# Patient Record
Sex: FEMALE | Race: WHITE | Employment: FULL TIME | URBAN - METROPOLITAN AREA
[De-identification: names, ages, dates, MRNs, and addresses within clinical notes are randomized per-mention and may not be internally consistent; named-entity substitution may affect disease eponyms.]

---

## 2017-02-28 ENCOUNTER — OFFICE VISIT (OUTPATIENT)
Dept: FAMILY MEDICINE CLINIC | Age: 32
End: 2017-02-28

## 2017-02-28 ENCOUNTER — TELEPHONE (OUTPATIENT)
Dept: FAMILY MEDICINE CLINIC | Age: 32
End: 2017-02-28

## 2017-02-28 VITALS
HEIGHT: 69 IN | TEMPERATURE: 99 F | DIASTOLIC BLOOD PRESSURE: 82 MMHG | SYSTOLIC BLOOD PRESSURE: 119 MMHG | OXYGEN SATURATION: 98 % | WEIGHT: 293 LBS | BODY MASS INDEX: 43.4 KG/M2 | RESPIRATION RATE: 16 BRPM | HEART RATE: 98 BPM

## 2017-02-28 DIAGNOSIS — N92.1 MENORRHAGIA WITH IRREGULAR CYCLE: Primary | ICD-10-CM

## 2017-02-28 LAB
BILIRUB UR QL STRIP: NORMAL
GLUCOSE UR-MCNC: NEGATIVE MG/DL
HCG URINE, QL. (POC): NEGATIVE
HGB BLD-MCNC: 12.6 G/DL
KETONES P FAST UR STRIP-MCNC: NORMAL MG/DL
PH UR STRIP: 5.5 [PH] (ref 4.6–8)
PROT UR QL STRIP: NORMAL MG/DL
SP GR UR STRIP: 1.03 (ref 1–1.03)
UA UROBILINOGEN AMB POC: NORMAL (ref 0.2–1)
URINALYSIS CLARITY POC: NORMAL
URINALYSIS COLOR POC: NORMAL
URINE BLOOD POC: NORMAL
URINE LEUKOCYTES POC: NEGATIVE
URINE NITRITES POC: NEGATIVE
VALID INTERNAL CONTROL?: YES

## 2017-02-28 RX ORDER — FERROUS SULFATE 325(65) MG
325 TABLET, DELAYED RELEASE (ENTERIC COATED) ORAL 2 TIMES DAILY WITH MEALS
Qty: 60 TAB | Refills: 0 | Status: SHIPPED | OUTPATIENT
Start: 2017-02-28

## 2017-02-28 RX ORDER — IBUPROFEN 800 MG/1
800 TABLET ORAL
Qty: 30 TAB | Refills: 0 | Status: SHIPPED | OUTPATIENT
Start: 2017-02-28

## 2017-02-28 RX ORDER — NORGESTIMATE AND ETHINYL ESTRADIOL 0.25-0.035
1 KIT ORAL DAILY
Qty: 1 PACKAGE | Refills: 10 | Status: SHIPPED | OUTPATIENT
Start: 2017-02-28 | End: 2017-03-09

## 2017-02-28 NOTE — PROGRESS NOTES
Allan Corbett is a 36 y.o. female who presents for heavy menses. Reports prolonged bleeding since December. Had spotting on and off, not her normal menstrual period. Then a few days ago developed excessive bleeding and passing clots. Soaking through pads and tampons. Not taking OCP's. Taking advil for cramps. The start of her current bleeding episode was 2017. She has a history of irregular menses for which she was on OCP's a long time ago and then stopped. She is not sexually active. She does not desire fertility at this time. Last pap in  was normal. Has history of obesity and prediabetes. She is motivated to start a weight loss program. Wants to become more active and start eating healthier. PMHx:  Past Medical History:   Diagnosis Date    Heart murmur of      HLD (hyperlipidemia)     Pre-diabetes        Meds:   Current Outpatient Prescriptions   Medication Sig Dispense Refill    ferrous sulfate (IRON) 325 mg (65 mg iron) EC tablet Take 1 Tab by mouth two (2) times daily (with meals). 60 Tab 0    ibuprofen (MOTRIN) 800 mg tablet Take 1 Tab by mouth every eight (8) hours as needed for Pain. 30 Tab 0    norgestimate-ethinyl estradiol (ORTHO-CYCLEN, SPRINTEC) 0.25-35 mg-mcg tab Take 1 Tab by mouth daily. 1 Package 10    cyclobenzaprine (FLEXERIL) 5 mg tablet Take 5 mg by mouth.  fluticasone (FLONASE) 50 mcg/actuation nasal spray 2 Sprays by Both Nostrils route once.          Allergies:   No Known Allergies    Smoker:  History   Smoking Status    Former Smoker    Packs/day: 0.25    Years: 10.00    Types: Cigarettes    Quit date: 2014   Smokeless Tobacco    Never Used       ETOH:   History   Alcohol Use    3.0 oz/week    5 Standard drinks or equivalent per week     Comment: social       FH:   Family History   Problem Relation Age of Onset    Heart Disease Mother     Hypertension Mother     Psychiatric Disorder Mother     Diabetes Father     Heart Disease Father  Psychiatric Disorder Sister     Cancer Maternal Grandmother      Lung cancer    Cancer Maternal Grandfather     Diabetes Other      Paternal great-grandmother   Joya Flores Other      family members with kidney stones       ROS:  General/Constitutional:   No headache, fever, fatigue, weight loss or weight gain       Eyes:   No redness, pruritis, pain, visual changes, swelling, or discharge      Ears:    No pain, loss or changes in hearing     Neck:   No swelling, masses, stiffness, pain, or limited movement     Cardiac:    No chest pain      Respiratory:   No cough or shortness of breath     GI:   No nausea/vomiting, diarrhea, abdominal pain, bloody or dark stools       :   No dysuria or  hematuria    Neurological:   No loss of consciousness, dizziness, seizures, dysarthria, cognitive changes, memory changes,  problems with balance, or unilateral weakness     Skin: No rash     Physical Exam:  Visit Vitals    /82 (BP 1 Location: Left arm, BP Patient Position: Sitting)    Pulse 98    Temp 99 °F (37.2 °C) (Oral)    Resp 16    Ht 5' 9\" (1.753 m)    Wt 321 lb 1.6 oz (145.7 kg)    LMP 02/28/2017    SpO2 98%    BMI 47.42 kg/m2     GEN: No apparent distress. Alert and oriented and responds to all questions appropriately. EYES:  Conjunctiva clear; pupils round and reactive to light; extraocular movements are intact. EAR: External ears are normal.  Tympanic membranes are clear and without effusion. NOSE: Turbinates are within normal limits. No drainage  OROPHYARYNX: No oral lesions or exudates. NECK:  Supple; no masses; thyroid normal           LUNGS: Respirations unlabored; clear to auscultation bilaterally  CARDIOVASCULAR: Regular, rate, and rhythm without murmurs, gallops or rubs   ABDOMEN: Soft; nontender; nondistended; normoactive bowel sounds; no masses or organomegaly  NEUROLOGIC:  No focal neurologic deficits. Strength and sensation grossly intact. Coordination and gait grossly intact. EXT: Well perfused. No edema. SKIN: No obvious rashes. Assessment:    ICD-10-CM ICD-9-CM    1. Menorrhagia with irregular cycle N92.1 626.2 AMB POC HEMOGLOBIN (HGB)      AMB POC URINE PREGNANCY TEST, VISUAL COLOR COMPARISON      AMB POC URINALYSIS DIP STICK AUTO W/O MICRO      US PELV NON OBS      US TRANSVAGINAL      ferrous sulfate (IRON) 325 mg (65 mg iron) EC tablet      ibuprofen (MOTRIN) 800 mg tablet      norgestimate-ethinyl estradiol (ORTHO-CYCLEN, SPRINTEC) 0.25-35 mg-mcg tab   2. BMI 45.0-49.9, adult [facility]) Z68.42 V85.42      35 yo morbidly obese female with menorrhagia and irregular menstrual cycles. Plan: Start OCP's and iron supplement. Motrin prn. Ordered pelvic ultrasound and will have pt return in one week for follow up and to discuss results. Likely has PCOS and features of metabolic syndrome, consider adding metformin. Discussed the patient's BMI with her. The BMI follow up plan is as follows: I have counseled this patient on diet and exercise regimens      Follow-up Disposition:  Return in about 1 week (around 3/7/2017).      María Elena Hudson, PGY-3  Family Medicine Resident

## 2017-02-28 NOTE — PROGRESS NOTES
Chief Complaint   Patient presents with    Menstrual Problem     excessive bleeding since this morning clotting from past two days, just pain today.

## 2017-02-28 NOTE — MR AVS SNAPSHOT
Visit Information Date & Time Provider Department Dept. Phone Encounter #  
 2/28/2017  5:30 PM Marianela Redding, Greg5 [facility] [phone] [id number] Follow-up Instructions Return in about 1 week (around 3/7/2017). Upcoming Health Maintenance Date Due  
 PAP AKA CERVICAL CYTOLOGY 5/5/2018 DTaP/Tdap/Td series (2 - Td) 7/10/2024 Allergies as of 2/28/2017  Review Complete On: 2/28/2017 By: Vira Ratliff LPN No Known Allergies Current Immunizations  Reviewed on 11/27/2016 Name Date Influenza Vaccine 11/1/2016 MMR 8/14/2014, 7/14/2014 TB Skin Test (PPD) 7/14/2014 TB Skin Test (PPD) Intradermal 8/4/2015, 7/14/2015 Tdap 7/10/2014 Not reviewed this visit You Were Diagnosed With   
  
 Codes Comments Menorrhagia with irregular cycle    -  Primary ICD-10-CM: N92.1 ICD-9-CM: 626.2 Vitals BP  
  
  
  
  
  
 119/82 (BP 1 Location: Left arm, BP Patient Position: Sitting) BMI and BSA Data Body Mass Index Body Surface Area  
 47.42 kg/m 2 2.66 m 2 Preferred Pharmacy Pharmacy Name Phone [facility] [place], [address] [place] AT [facility] [phone] Your Updated Medication List  
  
   
This list is accurate as of: 2/28/17  6:07 PM.  Always use your most recent med list.  
  
  
  
  
 cyclobenzaprine 5 mg tablet Commonly known as:  FLEXERIL Take 5 mg by mouth. ferrous sulfate 325 mg (65 mg iron) EC tablet Commonly known as:  IRON Take 1 Tab by mouth two (2) times daily (with meals). fluticasone 50 mcg/actuation nasal spray Commonly known as:  Ba Riser 2 Sprays by Both Nostrils route once. ibuprofen 800 mg tablet Commonly known as:  MOTRIN Take 1 Tab by mouth every eight (8) hours as needed for Pain.  
  
 norgestimate-ethinyl estradiol 0.25-35 mg-mcg Tab Commonly known as:  Keely Logan Take 1 Tab by mouth daily. Prescriptions Sent to Pharmacy Refills  
 ferrous sulfate (IRON) 325 mg (65 mg iron) EC tablet 0 Sig: Take 1 Tab by mouth two (2) times daily (with meals). Class: Normal  
 Pharmacy: [address] Ph #: [phone] Route: Oral  
 ibuprofen (MOTRIN) 800 mg tablet 0 Sig: Take 1 Tab by mouth every eight (8) hours as needed for Pain. Class: Normal  
 Pharmacy: [address] Ph #: [phone] Route: Oral  
 norgestimate-ethinyl estradiol (ORTHO-CYCLEN, SPRINTEC) 0.25-35 mg-mcg tab 10 Sig: Take 1 Tab by mouth daily. Class: Normal  
 Pharmacy: [address] Ph #: [phone] Route: Oral  
  
We Performed the Following AMB POC HEMOGLOBIN (HGB) [16243 CPT(R)] AMB POC URINALYSIS DIP STICK AUTO W/O MICRO [15366 CPT(R)] AMB POC URINE PREGNANCY TEST, VISUAL COLOR COMPARISON [56048 CPT(R)] Follow-up Instructions Return in about 1 week (around 3/7/2017). To-Do List   
 02/28/2017 Imaging:  US PELV NON OBS   
  
 02/28/2017 Imaging:  US TRANSVAGINAL Patient Instructions Anemia From Heavy Bleeding: Care Instructions Your Care Instructions Anemia means that your body does not have enough red blood cells. Red blood cells carry oxygen around the body. When you have anemia, you may feel dizzy, tired, and weak. You may also feel your heart pounding. For some people, it's hard to focus and think clearly. One common cause of anemia is bleeding. Bleeding from ulcers, hemorrhoids, cancer, or other problems can cause anemia. It may also be caused by heavy menstrual periods. Your treatment may include iron pills. Iron helps your body make hemoglobin. Hemoglobin is the part of the red blood cell that carries oxygen. If you have severe anemia, you may need a blood transfusion to give you red blood cells as quickly as possible. Sometimes it takes several months to get iron levels back to normal. 
Follow-up care is a key part of your treatment and safety. Be sure to make and go to all appointments, and call your doctor if you are having problems. It's also a good idea to know your test results and keep a list of the medicines you take. How can you care for yourself at home? · Be safe with medicines. Take your medicines exactly as prescribed. Call your doctor if you think you are having a problem with your medicine. · Follow your doctor's advice about eating foods that have a lot of iron in them. These include red meat, shellfish, poultry, and eggs. They also include beans, raisins, whole-grain bread, and leafy green vegetables. · Steam your vegetables. This is the best way to prepare them if you want to get as much iron as possible. · Iron pills can cause constipation. If you take them, there are things you can do to avoid constipation. Drink plenty of fluids, eat foods with a lot of fiber, and exercise every day. When should you call for help? Call 911 anytime you think you may need emergency care. For example, call if: 
· You passed out (lost consciousness). · Your stools are maroon or very bloody. Call your doctor now or seek immediate medical care if: 
· You have new or worse trouble breathing. · You are dizzy or lightheaded, or you feel like you may faint. · You have any abnormal bleeding, such as: 
¨ Nosebleeds. ¨ Vaginal bleeding that is different (heavier, more frequent, at a different time of the month) than what you are used to. ¨ Bloody or black stools, or rectal bleeding. ¨ Bloody or pink urine. Watch closely for changes in your health, and be sure to contact your doctor if: 
· You feel weaker or more tired than usual. 
· You do not get better as expected. Where can you learn more? Go to http://yumiko-maxine.info/. Enter A287 in the search box to learn more about \"Anemia From Heavy Bleeding: Care Instructions. \" Current as of: June 17, 2016 Content Version: 11.1 © 7845-7196 [employer]. Care instructions adapted under license by [employer] (which disclaims liability or warranty for this information). If you have questions about a medical condition or this instruction, always ask your healthcare professional. [address] any warranty or liability for your use of this information. Introducing [facility]! Josh Kilpatrick introduces [employer] patient portal. Now you can access parts of your medical record, email your doctor's office, and request medication refills online. 1. In your internet browser, go to [email or web address] 2. Click on the First Time User? Click Here link in the Sign In box. You will see the New Member Sign Up page. 3. Enter your Zimbra Access Code exactly as it appears below. You will not need to use this code after youve completed the sign-up process. If you do not sign up before the expiration date, you must request a new code. · Zimbra Access Code: [email or web address] Expires: 5/29/2017  6:07 PM 
 
4. Enter the last four digits of your Social Security Number (xxxx) and Date of Birth (mm/dd/yyyy) as indicated and click Submit. You will be taken to the next sign-up page. 5. Create a [employer] ID. This will be your Zimbra login ID and cannot be changed, so think of one that is secure and easy to remember. 6. Create a Zimbra password. You can change your password at any time. 7. Enter your Password Reset Question and Answer.  This can be used at a later time if you forget your password. 8. Enter your e-mail address. You will receive e-mail notification when new information is available in [address] 9. Click Sign Up. You can now view and download portions of your medical record. 10. Click the Download Summary menu link to download a portable copy of your medical information. If you have questions, please visit the Frequently Asked Questions section of the [employer] website. Remember, [employer] is NOT to be used for urgent needs. For medical emergencies, dial 911. Now available from your iPhone and Android! Please provide this summary of care documentation to your next provider. Your primary care clinician is listed as Joya Monroe. If you have any questions after today's visit, please call [phone].

## 2017-02-28 NOTE — TELEPHONE ENCOUNTER
Called to say has had 3 heavy days of menstrual cycle with bleeding. Also a lot of clots. Offered an appointment for today and accepted.     Tuesday, February 28, 2017 05:30 PM   [facility] OFFICE, [email or web address], SameDayAppt, 15min    excessive heavy menstrual cycle for the past few days, Dr. Willis Dumas patient, cm

## 2017-02-28 NOTE — PATIENT INSTRUCTIONS
Anemia From Heavy Bleeding: Care Instructions  Your Care Instructions    Anemia means that your body does not have enough red blood cells. Red blood cells carry oxygen around the body. When you have anemia, you may feel dizzy, tired, and weak. You may also feel your heart pounding. For some people, it's hard to focus and think clearly. One common cause of anemia is bleeding. Bleeding from ulcers, hemorrhoids, cancer, or other problems can cause anemia. It may also be caused by heavy menstrual periods. Your treatment may include iron pills. Iron helps your body make hemoglobin. Hemoglobin is the part of the red blood cell that carries oxygen. If you have severe anemia, you may need a blood transfusion to give you red blood cells as quickly as possible. Sometimes it takes several months to get iron levels back to normal.  Follow-up care is a key part of your treatment and safety. Be sure to make and go to all appointments, and call your doctor if you are having problems. It's also a good idea to know your test results and keep a list of the medicines you take. How can you care for yourself at home? · Be safe with medicines. Take your medicines exactly as prescribed. Call your doctor if you think you are having a problem with your medicine. · Follow your doctor's advice about eating foods that have a lot of iron in them. These include red meat, shellfish, poultry, and eggs. They also include beans, raisins, whole-grain bread, and leafy green vegetables. · Steam your vegetables. This is the best way to prepare them if you want to get as much iron as possible. · Iron pills can cause constipation. If you take them, there are things you can do to avoid constipation. Drink plenty of fluids, eat foods with a lot of fiber, and exercise every day. When should you call for help? Call 911 anytime you think you may need emergency care. For example, call if:  · You passed out (lost consciousness).   · Your stools are maroon or very bloody. Call your doctor now or seek immediate medical care if:  · You have new or worse trouble breathing. · You are dizzy or lightheaded, or you feel like you may faint. · You have any abnormal bleeding, such as:  ¨ Nosebleeds. ¨ Vaginal bleeding that is different (heavier, more frequent, at a different time of the month) than what you are used to. ¨ Bloody or black stools, or rectal bleeding. ¨ Bloody or pink urine. Watch closely for changes in your health, and be sure to contact your doctor if:  · You feel weaker or more tired than usual.  · You do not get better as expected. Where can you learn more? Go to http://yumiko-maxine.info/. Enter P707 in the search box to learn more about \"Anemia From Heavy Bleeding: Care Instructions. \"  Current as of: June 17, 2016  Content Version: 11.1  © 5366-7727 [employer]. Care instructions adapted under license by [employer] (which disclaims liability or warranty for this information). If you have questions about a medical condition or this instruction, always ask your healthcare professional. [place] [address] any warranty or liability for your use of this information.

## 2017-03-06 ENCOUNTER — HOSPITAL ENCOUNTER (OUTPATIENT)
Dept: ULTRASOUND IMAGING | Age: 32
Discharge: HOME | End: 2017-03-06
Attending: FAMILY MEDICINE
Payer: COMMERCIAL

## 2017-03-06 DIAGNOSIS — N92.1 MENORRHAGIA WITH IRREGULAR CYCLE: ICD-10-CM

## 2017-03-06 PROCEDURE — 76830 TRANSVAGINAL US NON-OB: CPT

## 2017-03-06 PROCEDURE — 76856 US EXAM PELVIC COMPLETE: CPT

## 2017-03-07 ENCOUNTER — HOSPITAL ENCOUNTER (OUTPATIENT)
Age: 32
Setting detail: OBSERVATION
Discharge: HOME | End: 2017-03-07
Attending: EMERGENCY MEDICINE | Admitting: FAMILY MEDICINE
Payer: COMMERCIAL

## 2017-03-07 ENCOUNTER — TELEPHONE (OUTPATIENT)
Dept: FAMILY MEDICINE CLINIC | Age: 32
End: 2017-03-07

## 2017-03-07 VITALS
HEART RATE: 107 BPM | WEIGHT: 293 LBS | TEMPERATURE: 99.4 F | DIASTOLIC BLOOD PRESSURE: 50 MMHG | SYSTOLIC BLOOD PRESSURE: 136 MMHG | HEIGHT: 69 IN | BODY MASS INDEX: 43.4 KG/M2 | OXYGEN SATURATION: 96 % | RESPIRATION RATE: 18 BRPM

## 2017-03-07 DIAGNOSIS — D64.9 ANEMIA, UNSPECIFIED: Primary | ICD-10-CM

## 2017-03-07 DIAGNOSIS — N92.1 MENORRHAGIA WITH IRREGULAR CYCLE: ICD-10-CM

## 2017-03-07 LAB
ANION GAP BLD CALC-SCNC: 10 MMOL/L (ref 5–15)
BASOPHILS # BLD AUTO: 0 K/UL (ref 0–0.1)
BASOPHILS # BLD: 0 % (ref 0–1)
BUN SERPL-MCNC: 8 MG/DL (ref 6–20)
BUN/CREAT SERPL: 11 (ref 12–20)
CALCIUM SERPL-MCNC: 8.1 MG/DL (ref 8.5–10.1)
CHLORIDE SERPL-SCNC: 105 MMOL/L (ref 97–108)
CO2 SERPL-SCNC: 24 MMOL/L (ref 21–32)
CREAT SERPL-MCNC: 0.76 MG/DL (ref 0.55–1.02)
EOSINOPHIL # BLD: 0 K/UL (ref 0–0.4)
EOSINOPHIL NFR BLD: 0 % (ref 0–7)
ERYTHROCYTE [DISTWIDTH] IN BLOOD BY AUTOMATED COUNT: 13.3 % (ref 11.5–14.5)
GLUCOSE SERPL-MCNC: 104 MG/DL (ref 65–100)
HCT VFR BLD AUTO: 25.9 % (ref 35–47)
HGB BLD-MCNC: 8.5 G/DL (ref 11.5–16)
LYMPHOCYTES # BLD AUTO: 19 % (ref 12–49)
LYMPHOCYTES # BLD: 2.1 K/UL (ref 0.8–3.5)
MCH RBC QN AUTO: 29.6 PG (ref 26–34)
MCHC RBC AUTO-ENTMCNC: 32.8 G/DL (ref 30–36.5)
MCV RBC AUTO: 90.2 FL (ref 80–99)
MONOCYTES # BLD: 0.4 K/UL (ref 0–1)
MONOCYTES NFR BLD AUTO: 3 % (ref 5–13)
NEUTS SEG # BLD: 8.9 K/UL (ref 1.8–8)
NEUTS SEG NFR BLD AUTO: 78 % (ref 32–75)
PLATELET # BLD AUTO: 208 K/UL (ref 150–400)
POTASSIUM SERPL-SCNC: 4.2 MMOL/L (ref 3.5–5.1)
RBC # BLD AUTO: 2.87 M/UL (ref 3.8–5.2)
SODIUM SERPL-SCNC: 139 MMOL/L (ref 136–145)
WBC # BLD AUTO: 11.4 K/UL (ref 3.6–11)

## 2017-03-07 PROCEDURE — 99284 EMERGENCY DEPT VISIT MOD MDM: CPT

## 2017-03-07 PROCEDURE — 36415 COLL VENOUS BLD VENIPUNCTURE: CPT | Performed by: EMERGENCY MEDICINE

## 2017-03-07 PROCEDURE — 74011250636 HC RX REV CODE- 250/636: Performed by: PHYSICIAN ASSISTANT

## 2017-03-07 PROCEDURE — 80048 BASIC METABOLIC PNL TOTAL CA: CPT | Performed by: EMERGENCY MEDICINE

## 2017-03-07 PROCEDURE — 96360 HYDRATION IV INFUSION INIT: CPT

## 2017-03-07 PROCEDURE — 74011250637 HC RX REV CODE- 250/637: Performed by: PHYSICIAN ASSISTANT

## 2017-03-07 PROCEDURE — 85025 COMPLETE CBC W/AUTO DIFF WBC: CPT | Performed by: EMERGENCY MEDICINE

## 2017-03-07 RX ORDER — SODIUM CHLORIDE 9 MG/ML
1000 INJECTION, SOLUTION INTRAVENOUS ONCE
Status: COMPLETED | OUTPATIENT
Start: 2017-03-07 | End: 2017-03-07

## 2017-03-07 RX ADMIN — ESTROGENS, CONJUGATED 2 MG: 0.62 TABLET, FILM COATED ORAL at 19:33

## 2017-03-07 RX ADMIN — SODIUM CHLORIDE 1000 ML/HR: 900 INJECTION, SOLUTION INTRAVENOUS at 18:53

## 2017-03-07 NOTE — TELEPHONE ENCOUNTER
Spoke with patient and she stated she was seen in the clinic yesterday for irregular bleeding in her menstrual cycle. She informed me she was given birth control pills, ibuprofen 600mg and iron to take to control bleeding, and f/u Thursday. Today she stated her bleeding has continued and she is having to change her pad every 30 minutes and is concerned. I asked the opinion of another nurse to confirm that she should be evaluated by the ER at this point since she is losing so much blood. Patient agreed.

## 2017-03-07 NOTE — Clinical Note
Patient Class[de-identified] Observation [571] Type of Bed: Telemetry Remote [29] Reason for Observation: anemia Admitting Physician: Agustin Walker [[id number]] Attending Physician: Agustin Walker [[id number]] Diagnosis: anemia

## 2017-03-07 NOTE — TELEPHONE ENCOUNTER
Patient called back to office. She has been bleeding since her appointment on 2/28 with Dr. Daniella Holliday. She has a f/u appointment scheduled for this Thursday but would like to speak with a nurse.  Call triaged by nurse Savannah Lantigua

## 2017-03-07 NOTE — TELEPHONE ENCOUNTER
Patient would like to speak with a nurse, she states she is still having the same symptoms, I informed the patient the nurse is in clinic and asked if I can take a message she stated that's fine she will go to the hospital and hung up. I was unable to get any further information and she hung up before I could speak again.

## 2017-03-07 NOTE — ED TRIAGE NOTES
Pt reports pelvic/transvaginal US yesterday per GYN, . Pt reports heavy bright red vaginal bleeding with clots x 2 day, but bleeding started 2/28/17. Using 2 pads per hour.

## 2017-03-07 NOTE — ED PROVIDER NOTES
HPI Comments: This is a 37 yo  female with medical hx remarkable for hyperlipidemia, and pre-diabetes presenting ambulatory to the ED with complaint of increased vaginal bleeding for the past 36 hrs, soaking a 1.5 pads per hr. Passing large clots with standing. Has tried laying around with minimal improvement. Has had spotting since 2016. Saw PCP last week when bleeding became more heavy. Started on OCP, iron and ibuprofen for pain. Some improvement for 3-4 days. Hx of similar several years ago. US pelvic yesterday remarkable for Nabothian cyst. Some fatigue. No dizziness, syncope, headache, chest pain, SOB, abdominal pain, nausea, vomiting, or urinary complaint. Denies sexual activity. Patient is a 36 y.o. female presenting with vaginal bleeding. The history is provided by the patient. Vaginal Bleeding   Pertinent negatives include no chest pain, no abdominal pain, no headaches and no shortness of breath.         Past Medical History:   Diagnosis Date    Heart murmur of      HLD (hyperlipidemia)     Pre-diabetes        Past Surgical History:   Procedure Laterality Date    HX WISDOM TEETH EXTRACTION           Family History:   Problem Relation Age of Onset    Heart Disease Mother     Hypertension Mother     Psychiatric Disorder Mother     Diabetes Father     Heart Disease Father     Psychiatric Disorder Sister     Cancer Maternal Grandmother      Lung cancer    Cancer Maternal Grandfather     Diabetes Other      Paternal great-grandmother   Feliciano Duong Other      family members with kidney stones       Social History     Social History    Marital status: SINGLE     Spouse name: N/A    Number of children: 0    Years of education: 15     Occupational History    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[employer]    Student      Goes to [facility] [place] [facility] for health services      Social History Main Topics    Smoking status: Former Smoker     Packs/day: 0.25     Years: 10.00     Types: Cigarettes     Quit date: 7/30/2014    Smokeless tobacco: Never Used    Alcohol use 3.0 oz/week     5 Standard drinks or equivalent per week      Comment: social    Drug use: No    Sexual activity: Yes     Birth control/ protection: Condom     Other Topics Concern    Not on file     Social History Narrative         ALLERGIES: Review of patient's allergies indicates no known allergies. Review of Systems   Constitutional: Negative. Negative for chills, fatigue and fever. HENT: Negative. Negative for congestion, ear pain, facial swelling, rhinorrhea, sneezing and sore throat. Eyes: Negative for pain, discharge and itching. Respiratory: Negative for cough, chest tightness and shortness of breath. Cardiovascular: Negative. Negative for chest pain and leg swelling. Gastrointestinal: Negative. Negative for abdominal distention, abdominal pain, constipation, diarrhea, nausea and vomiting. Genitourinary: Positive for vaginal bleeding. Negative for difficulty urinating, frequency and urgency. Musculoskeletal: Negative for arthralgias, back pain, joint swelling, neck pain and neck stiffness. Skin: Negative for color change and rash. Neurological: Negative for dizziness, numbness and headaches. Psychiatric/Behavioral: Negative for confusion and decreased concentration. All other systems reviewed and are negative. Vitals:    03/07/17 1428   BP: 133/79   Pulse: (!) 107   Resp: 18   Temp: 99.4 °F (37.4 °C)   SpO2: 99%   Weight: 147.4 kg (324 lb 15.3 oz)   Height: 5' 9\" (1.753 m)            Physical Exam   Constitutional: She is oriented to person, place, and time. She appears well-developed and well-nourished. No distress. Obese  female in NAD   HENT:   Head: Normocephalic and atraumatic. Right Ear: External ear normal.   Left Ear: External ear normal.   Nose: Nose normal.   Mouth/Throat: Oropharynx is clear and moist. No oropharyngeal exudate.    Eyes: Conjunctivae and EOM are normal. Pupils are equal, round, and reactive to light. Right eye exhibits no discharge. Left eye exhibits no discharge. No scleral icterus. Neck: Normal range of motion. Neck supple. Cardiovascular: Regular rhythm. Exam reveals no gallop and no friction rub. No murmur heard. Tachycardic     Pulmonary/Chest: Effort normal and breath sounds normal. She has no wheezes. She has no rales. Abdominal: Soft. Bowel sounds are normal. She exhibits no distension. There is no tenderness. There is no rebound and no guarding. Genitourinary: Cervix exhibits no motion tenderness and no discharge. Right adnexum displays no mass. Left adnexum displays no mass. There is bleeding (~20 cc's in vault with continued bleeding ) in the vagina. No signs of injury around the vagina. Neurological: She is alert and oriented to person, place, and time. Skin: Skin is warm and dry. She is not diaphoretic. Psychiatric: She has a normal mood and affect. Her behavior is normal.   Nursing note and vitals reviewed. MDM  Number of Diagnoses or Management Options  Anemia, unspecified:   Menorrhagia with irregular cycle:   Diagnosis management comments: 37 yo  female with vaginal bleeding x one week worse in past 48 hrs. Significant bleeding on exam with tachycardia. Unremarkable pelvic US yesterday. On iron and OCP. Plan  CBC and CMP. POc urine preg negative. Esperanza Burgess [address]         Amount and/or Complexity of Data Reviewed  Clinical lab tests: ordered and reviewed      ED Course       Procedures   Progress note  Labs reviewed. 3 gram Hgb drop per patient last 11. Will consult Ob hospitalist. Esperanza Burgess [address]  [facility] Madan Schroeder to guilherme kessler. Esperanza Burgess [address]  Pt seen and evaluated [facility]. Pt comfortable with D/C home and will return for worsening. [facility] Esperanza Santos [address]  Discussed case with attending Physician Michelle Portillo.   Agrees with care and will D/C with follow up. April ED [place], [address]    A/P  Menorrhagia: Follow-up with OB/gyn in two days. Start medications as prescribed. Return for any new or worsening.  April ED [facility], [address]

## 2017-03-08 NOTE — PROGRESS NOTES
Patient seen and evaluated. Recommend to stabilize uterine lining and cease bleeding, take estrogen tablet daily for 10 days (3/7/17 through 3/16/2017); then on 3/17/2017, begin taking OCPs- Reclipsen. MD verbally called medications into pharmacy already.

## 2017-03-08 NOTE — CONSULTS
Gynecology History and Physical    Name: Lear Alpers MRN: [id number] SSN: [id number]    YOB: 1985  Age: 36 y.o. Sex: female       Subjective:      Chief complaint: Vaginal Bleeding    Frank Gregorio is a 36 y.o.  female presents to ER with increased vaginal bleeding. She reports history of irregular menses. She states menses started 17, she immediately started on OCPs then, bleeding decreased, and today 1 week later, expressing increased vaginal bleeding. She reports was informed to begin OCPs right away on . She denies any dizziness, weakness nor palpitations. H/H in ER 8., MCV 90; she reports feeling hungry. The current method of family planning is oral contraceptive (estrogen/progesterone).     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      Goes to [employer] for health services      Social History Main Topics    Smoking status: Former Smoker     Packs/day: 0.25     Years: 10.00     Types: Cigarettes     Quit date: 2014    Smokeless tobacco: Never Used    Alcohol use 3.0 oz/week     5 Standard drinks or equivalent per week      Comment: social    Drug use: No    Sexual activity: Yes     Birth control/ protection: Condom     Family History   Problem Relation Age of Onset    Heart Disease Mother     Hypertension Mother     Psychiatric Disorder Mother     Diabetes Father     Heart Disease Father     Psychiatric Disorder Sister     Cancer Maternal Grandmother      Lung cancer    Cancer Maternal Grandfather     Diabetes Other      Paternal great-grandmother   Favio Herb Other      family members with kidney stones        No Known Allergies  Prior to Admission medications    Medication Sig Start Date End Date Taking? Authorizing Provider   LORATADINE (CLARITIN PO) Take  by mouth. Yes Jose Mae MD   ferrous sulfate (IRON) 325 mg (65 mg iron) EC tablet Take 1 Tab by mouth two (2) times daily (with meals). 2/28/17  Yes Yohana Navarro MD   ibuprofen (MOTRIN) 800 mg tablet Take 1 Tab by mouth every eight (8) hours as needed for Pain. 2/28/17  Yes Yohana Navarro MD   norgestimate-ethinyl estradiol (ORTHO-CYCLEN, [facility]) 0.25-35 mg-mcg tab Take 1 Tab by mouth daily. 2/28/17  Yes Yohana Navarro MD        Review of Systems:  A comprehensive review of systems was negative except for that written in the History of Present Illness. Objective:     Vitals:    03/07/17 1428   BP: 133/79   Pulse: (!) 107   Resp: 18   Temp: 99.4 °F (37.4 °C)   SpO2: 99%   Weight: 147.4 kg (324 lb 15.3 oz)   Height: 5' 9\" (1.753 m)       Physical Exam:  Patient without distress. Heart: Regular rate and rhythm  Lung: clear to auscultation throughout lung fields, no wheezes, no rales, no rhonchi and normal respiratory effort  Abdomen: obese, soft, nontender  External Genitalia: normal general appearance  Vagina: normal mucosa without prolapse or lesions  Cervix: normal appearance  Adnexa: normal bimanual exam  Uterus: normal single, nontender    Assessment:     Irregular menses, menorrhagia    Plan:     Discussion held with patient regarding reorganizing her endometrial linning. Recommend use of estrogen tablets 2 mg po orally for 10 days and then bridge to Cyclic OCPs, 1 tablet po daily x 3 weeks, then off x 1 week. Continue to take iron supplementation 325 mg, 1 tablet po BID with Vitamin C/orange juices. Advise to maintain adequate hydration. Pelvic ultrasound reviewed and is normal. Patient is clinically stable for outpatient management. Follow up @ office as scheduled for follow up bleeding profile.      Signed By:  Jodie Merchant MD     March 7, 2017

## 2017-03-08 NOTE — DISCHARGE INSTRUCTIONS
We hope that we have addressed all of your medical concerns. The examination and treatment you received in the Emergency Department were for an emergent problem and were not intended as complete care. It is important that you follow up with your healthcare provider(s) for ongoing care. If your symptoms worsen or do not improve as expected, and you are unable to reach your usual health care provider(s), you should return to the Emergency Department. Today's healthcare is undergoing tremendous change, and patient satisfaction surveys are one of the many tools to assess the quality of medical care. You may receive a survey from the [employer] regarding your experience in the Emergency Department. I hope that your experience has been completely positive, particularly the medical care that I provided. As such, please participate in the survey; anything less than excellent does not meet my expectations or intentions. [address] [facility] and [facility] participate in nationally recognized quality of care measures. If your blood pressure is greater than 120/80, as reported below, we urge that you seek medical care to address the potential of high blood pressure, commonly known as hypertension. Hypertension can be hereditary or can be caused by certain medical conditions, pain, stress, or \"white coat syndrome. \"       Please make an appointment with your health care provider(s) for follow up of your Emergency Department visit. VITALS:   Patient Vitals for the past 8 hrs:   Temp Pulse Resp BP SpO2   03/07/17 1428 99.4 °F (37.4 °C) (!) 107 18 133/79 99 %          Thank you for allowing us to provide you with medical care today. We realize that you have many choices for your emergency care needs. Please choose us in the future for any continued health care needs. Regards,           April ED.  [place], [address] Office: [phone]            Recent Results (from the past 24 hour(s))   CBC WITH AUTOMATED DIFF    Collection Time: 03/07/17  6:14 PM   Result Value Ref Range    WBC 11.4 (H) 3.6 - 11.0 K/uL    RBC 2.87 (L) 3.80 - 5.20 M/uL    HGB 8.5 (L) 11.5 - 16.0 g/dL    HCT 25.9 (L) 35.0 - 47.0 %    MCV 90.2 80.0 - 99.0 FL    MCH 29.6 26.0 - 34.0 PG    MCHC 32.8 30.0 - 36.5 g/dL    RDW 13.3 11.5 - 14.5 %    PLATELET 667 494 - 781 K/uL    NEUTROPHILS 78 (H) 32 - 75 %    LYMPHOCYTES 19 12 - 49 %    MONOCYTES 3 (L) 5 - 13 %    EOSINOPHILS 0 0 - 7 %    BASOPHILS 0 0 - 1 %    ABS. NEUTROPHILS 8.9 (H) 1.8 - 8.0 K/UL    ABS. LYMPHOCYTES 2.1 0.8 - 3.5 K/UL    ABS. MONOCYTES 0.4 0.0 - 1.0 K/UL    ABS. EOSINOPHILS 0.0 0.0 - 0.4 K/UL    ABS. BASOPHILS 0.0 0.0 - 0.1 K/UL   METABOLIC PANEL, BASIC    Collection Time: 03/07/17  6:14 PM   Result Value Ref Range    Sodium 139 136 - 145 mmol/L    Potassium 4.2 3.5 - 5.1 mmol/L    Chloride 105 97 - 108 mmol/L    CO2 24 21 - 32 mmol/L    Anion gap 10 5 - 15 mmol/L    Glucose 104 (H) 65 - 100 mg/dL    BUN 8 6 - 20 MG/DL    Creatinine 0.76 0.55 - 1.02 MG/DL    BUN/Creatinine ratio 11 (L) 12 - 20      GFR est AA >60 >60 ml/min/1.73m2    GFR est non-AA >60 >60 ml/min/1.73m2    Calcium 8.1 (L) 8.5 - 10.1 MG/DL       No results found. Anemia: Care Instructions  Your Care Instructions    Anemia is a low level of red blood cells, which carry oxygen throughout your body. Many things can cause anemia. Lack of iron is one of the most common causes. Your body needs iron to make hemoglobin, a substance in red blood cells that carries oxygen from the lungs to your body's cells. Without enough iron, the body produces fewer and smaller red blood cells. As a result, your body's cells do not get enough oxygen, and you feel tired and weak. And you may have trouble concentrating. Bleeding is the most common cause of a lack of iron.  You may have heavy menstrual bleeding or bleeding caused by conditions such as ulcers, hemorrhoids, or cancer. Regular use of aspirin or other anti-inflammatory medicines (such as ibuprofen) also can cause bleeding in some people. A lack of iron in your diet also can cause anemia, especially at times when the body needs more iron, such as during pregnancy, infancy, and the teen years. Your doctor may have prescribed iron pills. It may take several months of treatment for your iron levels to return to normal. Your doctor also may suggest that you eat foods that are rich in iron, such as meat and beans. There are many other causes of anemia. It is not always due to a lack of iron. Finding the specific cause of your anemia will help your doctor find the right treatment for you. Follow-up care is a key part of your treatment and safety. Be sure to make and go to all appointments, and call your doctor if you are having problems. It's also a good idea to know your test results and keep a list of the medicines you take. How can you care for yourself at home? · Take your medicines exactly as prescribed. Call your doctor if you think you are having a problem with your medicine. · If your doctor recommends iron pills, take them as directed:  ¨ Try to take the pills on an empty stomach about 1 hour before or 2 hours after meals. But you may need to take iron with food to avoid an upset stomach. ¨ Do not take antacids or drink milk or caffeine drinks (such as coffee, tea, or cola) at the same time or within 2 hours of the time that you take your iron. They can make it hard for your body to absorb the iron. ¨ Vitamin C (from food or supplements) helps your body absorb iron. Try taking iron pills with a glass of orange juice or some other food that is high in vitamin C, such as citrus fruits. ¨ Iron pills may cause stomach problems, such as heartburn, nausea, diarrhea, constipation, and cramps.  Be sure to drink plenty of fluids, and include fruits, vegetables, and fiber in your diet each day. Iron pills often make your bowel movements dark or green. ¨ If you forget to take an iron pill, do not take a double dose of iron the next time you take a pill. ¨ Keep iron pills out of the reach of small children. An overdose of iron can be very dangerous. · Follow your doctor's advice about eating iron-rich foods. These include red meat, shellfish, poultry, eggs, beans, raisins, whole-grain bread, and leafy green vegetables. · Steam vegetables to help them keep their iron content. When should you call for help? Call 911 anytime you think you may need emergency care. For example, call if:  · You have symptoms of a heart attack. These may include:  ¨ Chest pain or pressure, or a strange feeling in the chest.  ¨ Sweating. ¨ Shortness of breath. ¨ Nausea or vomiting. ¨ Pain, pressure, or a strange feeling in the back, neck, jaw, or upper belly or in one or both shoulders or arms. ¨ Lightheadedness or sudden weakness. ¨ A fast or irregular heartbeat. After you call 911, the  may tell you to chew 1 adult-strength or 2 to 4 low-dose aspirin. Wait for an ambulance. Do not try to drive yourself. · You passed out (lost consciousness). Call your doctor now or seek immediate medical care if:  · You have new or increased shortness of breath. · You are dizzy or lightheaded, or you feel like you may faint. · Your fatigue and weakness continue or get worse. · You have any abnormal bleeding, such as:  ¨ Nosebleeds. ¨ Vaginal bleeding that is different (heavier, more frequent, at a different time of the month) than what you are used to. ¨ Bloody or black stools, or rectal bleeding. ¨ Bloody or pink urine. Watch closely for changes in your health, and be sure to contact your doctor if:  · You do not get better as expected. Where can you learn more? Go to http://yumiko-maxine.info/. Enter R301 in the search box to learn more about \"Anemia: Care Instructions. \"  Current as of: February 5, 2016  Content Version: 11.1  © 0931-7138 [employer]. Care instructions adapted under license by [employer] (which disclaims liability or warranty for this information). If you have questions about a medical condition or this instruction, always ask your healthcare professional. [address] any warranty or liability for your use of this information. Anemia From Heavy Bleeding: Care Instructions  Your Care Instructions    Anemia means that your body does not have enough red blood cells. Red blood cells carry oxygen around the body. When you have anemia, you may feel dizzy, tired, and weak. You may also feel your heart pounding. For some people, it's hard to focus and think clearly. One common cause of anemia is bleeding. Bleeding from ulcers, hemorrhoids, cancer, or other problems can cause anemia. It may also be caused by heavy menstrual periods. Your treatment may include iron pills. Iron helps your body make hemoglobin. Hemoglobin is the part of the red blood cell that carries oxygen. If you have severe anemia, you may need a blood transfusion to give you red blood cells as quickly as possible. Sometimes it takes several months to get iron levels back to normal.  Follow-up care is a key part of your treatment and safety. Be sure to make and go to all appointments, and call your doctor if you are having problems. It's also a good idea to know your test results and keep a list of the medicines you take. How can you care for yourself at home? · Be safe with medicines. Take your medicines exactly as prescribed. Call your doctor if you think you are having a problem with your medicine. · Follow your doctor's advice about eating foods that have a lot of iron in them. These include red meat, shellfish, poultry, and eggs. They also include beans, raisins, whole-grain bread, and leafy green vegetables. · Steam your vegetables.  This is the best way to prepare them if you want to get as much iron as possible. · Iron pills can cause constipation. If you take them, there are things you can do to avoid constipation. Drink plenty of fluids, eat foods with a lot of fiber, and exercise every day. When should you call for help? Call 911 anytime you think you may need emergency care. For example, call if:  · You passed out (lost consciousness). · Your stools are maroon or very bloody. Call your doctor now or seek immediate medical care if:  · You have new or worse trouble breathing. · You are dizzy or lightheaded, or you feel like you may faint. · You have any abnormal bleeding, such as:  ¨ Nosebleeds. ¨ Vaginal bleeding that is different (heavier, more frequent, at a different time of the month) than what you are used to. ¨ Bloody or black stools, or rectal bleeding. ¨ Bloody or pink urine. Watch closely for changes in your health, and be sure to contact your doctor if:  · You feel weaker or more tired than usual.  · You do not get better as expected. Where can you learn more? Go to http://yumiko-maxine.info/. Enter H981 in the search box to learn more about \"Anemia From Heavy Bleeding: Care Instructions. \"  Current as of: June 17, 2016  Content Version: 11.1  © 6788-8784 [employer]. Care instructions adapted under license by [employer] (which disclaims liability or warranty for this information). If you have questions about a medical condition or this instruction, always ask your healthcare professional. [place] [address] any warranty or liability for your use of this information.

## 2017-03-08 NOTE — ED NOTES
Discharged by April, [place] -- written instructions provided. Ambulated out of ED accompanied by family.

## 2017-03-09 ENCOUNTER — APPOINTMENT (OUTPATIENT)
Dept: ULTRASOUND IMAGING | Age: 32
DRG: 812 | End: 2017-03-09
Attending: STUDENT IN AN ORGANIZED HEALTH CARE EDUCATION/TRAINING PROGRAM
Payer: COMMERCIAL

## 2017-03-09 ENCOUNTER — HOSPITAL ENCOUNTER (INPATIENT)
Age: 32
LOS: 2 days | Discharge: HOME | DRG: 812 | End: 2017-03-11
Attending: EMERGENCY MEDICINE | Admitting: FAMILY MEDICINE
Payer: COMMERCIAL

## 2017-03-09 DIAGNOSIS — D64.9 SYMPTOMATIC ANEMIA: Primary | ICD-10-CM

## 2017-03-09 DIAGNOSIS — N93.9 VAGINAL BLEEDING: ICD-10-CM

## 2017-03-09 LAB
ANION GAP BLD CALC-SCNC: 12 MMOL/L (ref 5–15)
BUN SERPL-MCNC: 7 MG/DL (ref 6–20)
BUN/CREAT SERPL: 8 (ref 12–20)
CALCIUM SERPL-MCNC: 8.3 MG/DL (ref 8.5–10.1)
CHLORIDE SERPL-SCNC: 105 MMOL/L (ref 97–108)
CO2 SERPL-SCNC: 22 MMOL/L (ref 21–32)
CREAT SERPL-MCNC: 0.93 MG/DL (ref 0.55–1.02)
GLUCOSE SERPL-MCNC: 145 MG/DL (ref 65–100)
POTASSIUM SERPL-SCNC: 3.8 MMOL/L (ref 3.5–5.1)
SODIUM SERPL-SCNC: 139 MMOL/L (ref 136–145)
TSH SERPL DL<=0.05 MIU/L-ACNC: 3.14 UIU/ML (ref 0.36–3.74)

## 2017-03-09 PROCEDURE — 83001 ASSAY OF GONADOTROPIN (FSH): CPT

## 2017-03-09 PROCEDURE — 74011250636 HC RX REV CODE- 250/636: Performed by: STUDENT IN AN ORGANIZED HEALTH CARE EDUCATION/TRAINING PROGRAM

## 2017-03-09 PROCEDURE — P9016 RBC LEUKOCYTES REDUCED: HCPCS | Performed by: EMERGENCY MEDICINE

## 2017-03-09 PROCEDURE — 80048 BASIC METABOLIC PNL TOTAL CA: CPT | Performed by: EMERGENCY MEDICINE

## 2017-03-09 PROCEDURE — 85025 COMPLETE CBC W/AUTO DIFF WBC: CPT | Performed by: EMERGENCY MEDICINE

## 2017-03-09 PROCEDURE — 76857 US EXAM PELVIC LIMITED: CPT

## 2017-03-09 PROCEDURE — 74011250636 HC RX REV CODE- 250/636: Performed by: FAMILY MEDICINE

## 2017-03-09 PROCEDURE — 86900 BLOOD TYPING SEROLOGIC ABO: CPT | Performed by: EMERGENCY MEDICINE

## 2017-03-09 PROCEDURE — 76830 TRANSVAGINAL US NON-OB: CPT

## 2017-03-09 PROCEDURE — 77030029131 HC ADMN ST IV BLD N DEHP ICUM -B

## 2017-03-09 PROCEDURE — 74011250636 HC RX REV CODE- 250/636: Performed by: EMERGENCY MEDICINE

## 2017-03-09 PROCEDURE — 86920 COMPATIBILITY TEST SPIN: CPT | Performed by: EMERGENCY MEDICINE

## 2017-03-09 PROCEDURE — 93005 ELECTROCARDIOGRAM TRACING: CPT

## 2017-03-09 PROCEDURE — 84443 ASSAY THYROID STIM HORMONE: CPT

## 2017-03-09 PROCEDURE — 94761 N-INVAS EAR/PLS OXIMETRY MLT: CPT

## 2017-03-09 PROCEDURE — 36415 COLL VENOUS BLD VENIPUNCTURE: CPT | Performed by: EMERGENCY MEDICINE

## 2017-03-09 PROCEDURE — 99285 EMERGENCY DEPT VISIT HI MDM: CPT

## 2017-03-09 PROCEDURE — 30233N1 TRANSFUSION OF NONAUTOLOGOUS RED BLOOD CELLS INTO PERIPHERAL VEIN, PERCUTANEOUS APPROACH: ICD-10-PCS | Performed by: FAMILY MEDICINE

## 2017-03-09 PROCEDURE — 74011000258 HC RX REV CODE- 258: Performed by: FAMILY MEDICINE

## 2017-03-09 PROCEDURE — 65270000029 HC RM PRIVATE

## 2017-03-09 PROCEDURE — 36430 TRANSFUSION BLD/BLD COMPNT: CPT

## 2017-03-09 PROCEDURE — 96360 HYDRATION IV INFUSION INIT: CPT

## 2017-03-09 RX ORDER — ESTRADIOL 2 MG/1
2 TABLET ORAL DAILY
COMMUNITY
End: 2017-03-11

## 2017-03-09 RX ORDER — SODIUM CHLORIDE 0.9 % (FLUSH) 0.9 %
5-10 SYRINGE (ML) INJECTION AS NEEDED
Status: DISCONTINUED | OUTPATIENT
Start: 2017-03-09 | End: 2017-03-11 | Stop reason: HOSPADM

## 2017-03-09 RX ORDER — DESOGESTREL AND ETHINYL ESTRADIOL 0.15-0.03
1 KIT ORAL DAILY
COMMUNITY
End: 2017-03-09

## 2017-03-09 RX ORDER — LANOLIN ALCOHOL/MO/W.PET/CERES
325 CREAM (GRAM) TOPICAL 2 TIMES DAILY WITH MEALS
Status: DISCONTINUED | OUTPATIENT
Start: 2017-03-10 | End: 2017-03-11 | Stop reason: HOSPADM

## 2017-03-09 RX ORDER — DIPHENHYDRAMINE HCL 25 MG
25 CAPSULE ORAL
Status: DISCONTINUED | OUTPATIENT
Start: 2017-03-09 | End: 2017-03-11 | Stop reason: HOSPADM

## 2017-03-09 RX ORDER — DESOGESTREL AND ETHINYL ESTRADIOL 0.15-0.03
1 KIT ORAL DAILY
COMMUNITY
End: 2017-03-17

## 2017-03-09 RX ORDER — LORATADINE 10 MG/1
10 TABLET ORAL
COMMUNITY

## 2017-03-09 RX ORDER — SODIUM CHLORIDE 0.9 % (FLUSH) 0.9 %
5-10 SYRINGE (ML) INJECTION EVERY 8 HOURS
Status: DISCONTINUED | OUTPATIENT
Start: 2017-03-09 | End: 2017-03-09

## 2017-03-09 RX ORDER — SODIUM CHLORIDE 9 MG/ML
250 INJECTION, SOLUTION INTRAVENOUS AS NEEDED
Status: DISCONTINUED | OUTPATIENT
Start: 2017-03-09 | End: 2017-03-11 | Stop reason: HOSPADM

## 2017-03-09 RX ORDER — LORATADINE 10 MG/1
10 TABLET ORAL
Status: DISCONTINUED | OUTPATIENT
Start: 2017-03-09 | End: 2017-03-11 | Stop reason: HOSPADM

## 2017-03-09 RX ORDER — SODIUM CHLORIDE 0.9 % (FLUSH) 0.9 %
5-10 SYRINGE (ML) INJECTION AS NEEDED
Status: DISCONTINUED | OUTPATIENT
Start: 2017-03-09 | End: 2017-03-09

## 2017-03-09 RX ORDER — SODIUM CHLORIDE 0.9 % (FLUSH) 0.9 %
5-10 SYRINGE (ML) INJECTION EVERY 8 HOURS
Status: DISCONTINUED | OUTPATIENT
Start: 2017-03-09 | End: 2017-03-11 | Stop reason: HOSPADM

## 2017-03-09 RX ORDER — SODIUM CHLORIDE 9 MG/ML
185 INJECTION, SOLUTION INTRAVENOUS CONTINUOUS
Status: DISCONTINUED | OUTPATIENT
Start: 2017-03-09 | End: 2017-03-11 | Stop reason: HOSPADM

## 2017-03-09 RX ADMIN — SODIUM CHLORIDE 1000 ML: 900 INJECTION, SOLUTION INTRAVENOUS at 15:28

## 2017-03-09 RX ADMIN — Medication 10 ML: at 18:06

## 2017-03-09 RX ADMIN — SODIUM CHLORIDE 185 ML/HR: 900 INJECTION, SOLUTION INTRAVENOUS at 20:15

## 2017-03-09 RX ADMIN — SODIUM CHLORIDE 1000 ML: 900 INJECTION, SOLUTION INTRAVENOUS at 19:07

## 2017-03-09 RX ADMIN — SODIUM CHLORIDE 25 MG: 900 INJECTION, SOLUTION INTRAVENOUS at 18:05

## 2017-03-09 NOTE — H&P
[facility] [facility] [address] [place] [address]   Office [phone], Fax [phone]      History & Physical    Date of admission: 3/9/2017    Patient name: Jame Fuentes  MRN: [id number]  YOB: 1985  Age: 36 y.o. Primary care provider:  Ronna Pinedo MD     Source of Information: patient and medical records                                      Subjective:       Chief complain: vaginal bleeding    History of present illness:  Ms. Martin Osborn is a   36 y.o. female with past medical history significant for irregular periods, pre-diabetes, HLD and anemia who presents to the ED complaining of vaginal bleeding. Pt states that her vaginal bleeding started about 10 days ago. She went to see her PCP last Tuesday who started the pt on Sprintec to help control the pt's bleeding. She states was taking iron pills and Motrin also. Vaginal bleeding was worsening with large clots. She was feeling weak, dizzy, having palpitations, and came to the ED 2 days ago, where OBGYN started her on Estrogen. Pt claims that she has continued to experience vaginal bleeding with associated generalized weakness, SOB with walking and pallor. She vomited once today. She has been using 2 pads in an hour. Pt reports that she has been taking iron supplements. She denies any use of blood thinners, h/o liver disease, fever, chills, vaginal discharge ou foul odor. chest pain. Pt states that she has an irregular menstrual cycle with heavy vaginal bleeding. LMP in January 2017, irregular, lasting 3-7 days, pap smear 2 years ago was normal. Not sexually active for about a year.    US done on 3/6 shows no uterine fibroids or ovarian cystic mass detected ( obstructed by bowel gas.)  Hb : 12.5 in 05/31, 8.5 on 3/7, 5.8 on 3/9    Emergency Room Course:   Patient Vitals for the past 12 hrs:   Temp Pulse Resp BP SpO2   03/09/17 1645 - (!) 130 16 99/63 -   03/09/17 1630 - (!) 118 22 94/53 97 %   03/09/17 1600 - (!) 119 24 94/44 100 %   17 1530 - - - - 99 %   17 1530 - (!) 126 14 92/46 -   17 1500 - (!) 134 22 (!) 110/39 94 %   17 1449 - - - 125/73 -   17 1448 98.1 °F (36.7 °C) (!) 144 15 125/73 97 %      Labs: remarkable for Hb of 5.8  ECG showed sinus tachycardia. Pt was treated with   Medications   sodium chloride (NS) flush 5-10 mL (not administered)   sodium chloride (NS) flush 5-10 mL (not administered)   congugated estrogens (PREMARIN) 25 mg in 0.9% sodium chloride 50 mL IVPB (not administered)   sodium chloride 0.9 % bolus infusion 1,000 mL (1,000 mL IntraVENous New Bag 3/9/17 1528)         No Known Allergies    Prior to Admission Medications   Prescriptions Last Dose Informant Patient Reported? Taking?   desogestrel-ethinyl estradiol (APRI) 0.15-0.03 mg tab NOT started yet  Yes Yes   Sig: Take 1 Tab by mouth daily. estradiol (ESTRACE) 2 mg tablet 3/9/2017 at AM  Yes Yes   Sig: Take 2 mg by mouth daily. For 10 days starting 3/8/17, then transition to OCP (Apri)   ferrous sulfate (IRON) 325 mg (65 mg iron) EC tablet 3/9/2017 at AM  No Yes   Sig: Take 1 Tab by mouth two (2) times daily (with meals). ibuprofen (MOTRIN) 800 mg tablet   No Yes   Sig: Take 1 Tab by mouth every eight (8) hours as needed for Pain.   loratadine (CLARITIN) 10 mg tablet   Yes Yes   Sig: Take 10 mg by mouth daily as needed for Allergies. Facility-Administered Medications: None       Past Medical History:   Diagnosis Date    Heart murmur of      HLD (hyperlipidemia)     Pre-diabetes         Past Surgical History:   Procedure Laterality Date    HX WISDOM TEETH EXTRACTION            SOCIAL HISTORY    - Alcohol history: Rarely    - Smoking history: Non-smoker    - Illicit drug history: Not at all    - Living arrangement: patient lives with their family.     - Ambulates: Independently       Preventive history:  Immunization History   Administered Date(s) Administered    Influenza Vaccine 2016    MMR 2014, 2014    TB Skin Test (PPD) 2014    TB Skin Test (PPD) Intradermal 2015, 2015    Tdap 07/10/2014       CODE STATUS discussed with the patient/caregivers  FULL CODE      The following are negative unless bolded. General Fever, chills and unexpected weight change. HENT Ear pain, sinus pressure and sore throat. Eyes Visual disturbance. Respiratory Cough, chest tightness, shortness of breath and wheezing. Cardio Chest pain, palpitations and leg swelling. GI Nausea, vomiting, abd pain, diarrhea, constipation and blood in stool.  Dysuria. Extremities Myalgias and arthralgias. Skin Color change and pallor. Neuro Weakness and headaches. Behavioral Confusion, nervous, anxious. The remainder of the review of systems was reviewed and is noncontributory. Objective:    Patient Vitals for the past 12 hrs:   BP Temp Pulse Resp SpO2 Height Weight   17 1645 99/63 - (!) 130 16 - - -   17 1630 94/53 - (!) 118 22 97 % - -   17 1600 94/44 - (!) 119 24 100 % - -   17 1530 - - - - 99 % - -   17 1530 92/46 - (!) 126 14 - - -   17 1500 (!) 110/39 - (!) 134 22 94 % - -   17 1449 125/73 - - - - - -   17 1448 125/73 98.1 °F (36.7 °C) (!) 144 15 97 % 5' 9\" (1.753 m) 320 lb (145.2 kg)     Temp (24hrs), Av.1 °F (36.7 °C), Min:98.1 °F (36.7 °C), Max:98.1 °F (36.7 °C)    No intake or output data in the 24 hours ending 17 1746       O2 Device: Room air      Physical Exam  Visit Vitals    BP 99/63    Pulse (!) 130    Temp 98.1 °F (36.7 °C)    Resp 16    Ht 5' 9\" (1.753 m)    Wt 320 lb (145.2 kg)    LMP 2017    SpO2 97%    BMI 47.26 kg/m2       Vitals Reviewed. General Oriented to person, place, and time and well-developed. Appears well-nourished, no distress. Not diaphoretic, but weak    HENT Head Normocephalic and atraumatic. Eyes Conjunctivae are normal, no discharge. No scleral icterus. Nose Nose normal, clear turbinates. Oral Oropharynx is clear and moist. No oropharyngeal exudate. Neck No thyromegaly present. No cervical adenopathy. Cardio Normal rate, regular rhythm. Exam reveals no gallop and no friction rub. No murmur heard. No chest wall tenderness. Pulmonary Effort normal and breath sounds normal. No respiratory distress. No wheezes, no rales. Abdominal Soft. Bowel sounds normal. No distension. No tenderness.  Bright red blood from vagina. Pelvic exam ordered,but not done. Extremities No edema of lower extremities. No tenderness. Distal pulses intact. Neurological Alert and oriented to person, place, and time. Dermatology Skin is warm and dry. No rash noted. No erythema or pallor. Psychiatric Affect and judgment normal.        Data Review    Laboratory Data  Recent Results (from the past 8 hour(s))   CBC WITH AUTOMATED DIFF    Collection Time: 03/09/17  2:58 PM   Result Value Ref Range    WBC 14.7 (H) 3.6 - 11.0 K/uL    RBC 1.97 (L) 3.80 - 5.20 M/uL    HGB 5.8 (LL) 11.5 - 16.0 g/dL    HCT 18.1 (L) 35.0 - 47.0 %    MCV 91.9 80.0 - 99.0 FL    MCH 29.4 26.0 - 34.0 PG    MCHC 32.0 30.0 - 36.5 g/dL    RDW 14.5 11.5 - 14.5 %    PLATELET 752 910 - 515 K/uL    NEUTROPHILS 74 32 - 75 %    LYMPHOCYTES 21 12 - 49 %    MONOCYTES 5 5 - 13 %    EOSINOPHILS 0 0 - 7 %    BASOPHILS 0 0 - 1 %    ABS. NEUTROPHILS 10.9 (H) 1.8 - 8.0 K/UL    ABS. LYMPHOCYTES 3.1 0.8 - 3.5 K/UL    ABS. MONOCYTES 0.7 0.0 - 1.0 K/UL    ABS. EOSINOPHILS 0.0 0.0 - 0.4 K/UL    ABS.  BASOPHILS 0.0 0.0 - 0.1 K/UL    DF SMEAR SCANNED      RBC COMMENTS ANISOCYTOSIS  1+        RBC COMMENTS MACROCYTOSIS  1+        RBC COMMENTS POLYCHROMASIA  PRESENT       METABOLIC PANEL, BASIC    Collection Time: 03/09/17  2:58 PM   Result Value Ref Range    Sodium 139 136 - 145 mmol/L    Potassium 3.8 3.5 - 5.1 mmol/L    Chloride 105 97 - 108 mmol/L    CO2 22 21 - 32 mmol/L    Anion gap 12 5 - 15 mmol/L    Glucose 145 (H) 65 - 100 mg/dL    BUN 7 6 - 20 MG/DL    Creatinine 0.93 0.55 - 1.02 MG/DL    BUN/Creatinine ratio 8 (L) 12 - 20      GFR est AA >60 >60 ml/min/1.73m2    GFR est non-AA >60 >60 ml/min/1.73m2    Calcium 8.3 (L) 8.5 - 10.1 MG/DL   TYPE & SCREEN    Collection Time: 17  3:16 PM   Result Value Ref Range    Crossmatch Expiration 2017     ABO/Rh(D) A POSITIVE     Antibody screen NEG    EKG, 12 LEAD, INITIAL    Collection Time: 17  3:22 PM   Result Value Ref Range    Ventricular Rate 138 BPM    Atrial Rate 138 BPM    P-R Interval 120 ms    QRS Duration 70 ms    Q-T Interval 310 ms    QTC Calculation (Bezet) 469 ms    Calculated P Axis 13 degrees    Calculated R Axis 45 degrees    Calculated T Axis -9 degrees    Diagnosis       Sinus tachycardia  Otherwise normal ECG  No previous ECGs available         Imaging  CXR Results  (Last 48 hours)    None        CT Results  (Last 48 hours)    None              Assessment and Plan     Antonio Nuno is a 36 y.o. female with history of obesity, irregular periods, pre-diabetes, HLD and anemia who is admitted for symptomatic anemia secondary to a vaginal bleed. .      Acute anemia secondary to vaginal bleed:  Hgb in the ER 5.8 (baseline ~ 12.5). - Admit to Telemetry. - Vital signs per unit protocol  -  2 large bore peripheral IV lines. - Type and screen, plan to transfuse 3 units.  - H&H q6hr after transfusion.  - INR and platelet counts, IVF  - NPO  - consultation for OB hospitalist: Premarin IV  - continue iron sulfate  - Discontinue the following meds: Motrin  - SCD, ambulate with assistance, hcg  - labs: CBC, CMP,TSH, FSH, LH,     H/o irregular periods: possible PCOS  - f/u with OB gyn recs     H/o prediabetes: Last A1c: 5.6, not in prediabetic range. HLP: stable not on meds. Most recent lipid panel on 16. Chol T: 169, HDL:36, LDL: 93, T    Obesity: BMI: 47.   - Advise weight loss      FEN/GI - NPO. NS at 190 mL/hr.   Activity - Ambulate with assistance  DVT prophylaxis - SCDs  GI prophylaxis - Protonix  Disposition - Admit to Telemetry. Plan to d/c to Home. Code Status - Full, discussed with patient / caregivers. Patient Radha Hind to be discussed Dr. Sandra Maher (Attending Physician). 5:46 PM, 03/09/17  Gwen Conrad MD  Family Medicine Resident    For Billing   Chief Complaint   Patient presents with   [facility] Vaginal Bleeding       Hospital Problems  Date Reviewed: 7/15/2016          Codes Class Noted POA    Anemia ICD-10-CM: D64.9  ICD-9-CM: 285.9  3/9/2017 Unknown

## 2017-03-09 NOTE — ED PROVIDER NOTES
HPI Comments: 36 y.o. female with past medical history significant for pre-diabetes, HLD and anemia who presents from home with chief complaint of vaginal bleeding. Pt states that she has an irregular menstrual cycle with heavy vaginal bleeding. Pt states that she saw her PCP last Tuesday who started the pt on Sprintec to help control the pt's bleeding. Pt claims that she then developed worsening vaginal bleeding and came to the ED 2 days ago, where OBGYN started her on Estrogen. Pt claims that she has continued to experience vaginal bleeding with associated generalized weakness, SOB with walking and pallor. Pt reports that she has been taking iron supplements. There are no other acute medical concerns at this time. PCP: Jonna Wilkerson MD    Note written by Claudia Ochoa. Alejandra Zavala, as dictated by Flaquito Álvarez MD 2:51 PM      The history is provided by the patient. No  was used.         Past Medical History:   Diagnosis Date    Heart murmur of      HLD (hyperlipidemia)     Pre-diabetes        Past Surgical History:   Procedure Laterality Date    HX WISDOM TEETH EXTRACTION           Family History:   Problem Relation Age of Onset    Heart Disease Mother     Hypertension Mother     Psychiatric Disorder Mother     Diabetes Father     Heart Disease Father     Psychiatric Disorder Sister     Cancer Maternal Grandmother      Lung cancer    Cancer Maternal Grandfather     Diabetes Other      Paternal great-grandmother   Savannah Ellis Other      family members with kidney stones       Social History     Social History    Marital status: SINGLE     Spouse name: N/A    Number of children: 0    Years of education: 15     Occupational History    A [employer]    Student      Goes to [address] [place] [address] for health services      Social History Main Topics    Smoking status: Former Smoker     Packs/day: 0.25     Years: 10.00     Types: Cigarettes     Quit date: 2014  Smokeless tobacco: Never Used    Alcohol use 3.0 oz/week     5 Standard drinks or equivalent per week      Comment: social    Drug use: No    Sexual activity: Yes     Birth control/ protection: Condom     Other Topics Concern    Not on file     Social History Narrative         ALLERGIES: Review of patient's allergies indicates no known allergies. Review of Systems   Constitutional: Positive for fatigue. Negative for chills. Respiratory: Positive for shortness of breath. Gastrointestinal: Negative for abdominal pain, nausea and vomiting. Genitourinary: Positive for vaginal bleeding. Skin: Positive for pallor. Neurological: Positive for dizziness, weakness (generalized) and light-headedness. All other systems reviewed and are negative. Vitals:    03/09/17 1448   BP: 125/73   Pulse: (!) 144   Resp: 15   Temp: 98.1 °F (36.7 °C)   SpO2: 97%   Weight: 145.2 kg (320 lb)   Height: 5' 9\" (1.753 m)            Physical Exam   Constitutional: She is oriented to person, place, and time. She appears well-developed and well-nourished. No distress. HENT:   Head: Normocephalic and atraumatic. Mouth/Throat: Oropharynx is clear and moist.   Pale tongue   Eyes: EOM are normal. Pupils are equal, round, and reactive to light. Pale conjunctiva   Neck: Normal range of motion. Cardiovascular: Regular rhythm, normal heart sounds and intact distal pulses. Tachycardia present. No murmur heard. Pulmonary/Chest: Effort normal and breath sounds normal. No stridor. No respiratory distress. Abdominal: Soft. Bowel sounds are normal. There is no tenderness. Genitourinary:   Genitourinary Comments: deferred   Musculoskeletal: Normal range of motion. She exhibits no edema or tenderness. Neurological: She is alert and oriented to person, place, and time. No cranial nerve deficit. Skin: Skin is warm and dry. She is not diaphoretic. There is pallor. Psychiatric: She has a normal mood and affect. Nursing note and vitals reviewed. MDM  Number of Diagnoses or Management Options  Diagnosis management comments: Patient with continued vaginal bleeding - returns to the ED with increased weakness, DUMONT and lethargy. Check labs, give IVF and patient likely needs admit for blood transfusion. 1600 - HGB low, admit       Amount and/or Complexity of Data Reviewed  Clinical lab tests: reviewed and ordered  Discuss the patient with other providers: yes  Independent visualization of images, tracings, or specimens: yes      ED Course       Procedures    EKG: 3/9/2017 @ 15:22 sinus tachycardia, rate 138, no ischemia noted, normal intervals, no ischemia or ectopy noted.

## 2017-03-09 NOTE — ED NOTES
Called family practice and asked if blood transfusion would be ordered for patient at this time. Family practice stated that he would be putting that order in and would be returning back to the ED to have the patient sign a consent form.

## 2017-03-09 NOTE — IP AVS SNAPSHOT
[address] [facility] 
 
 
 [address] 
[phone] Patient: Simba Peres 
MRN: [id number] IZF:4/87/1981 You are allergic to the following No active allergies Recent Documentation Height Weight Breastfeeding? BMI OB Status Smoking Status 1.753 m 145.2 kg No 47.26 kg/m2 Having regular periods Former Smoker Unresulted Labs Order Current Status 17-OH PROGESTERONE LCMS In process TYPE & SCREEN Preliminary result Emergency Contacts Name Discharge Info Relation Home Work Mobile RiverasunHelen DISCHARGE CAREGIVER [3] Parent [1] [phone]    
 Theodoro Po [5] [phone] About your hospitalization You were admitted on:  March 9, 2017 You last received care in the:  [facility] 3 [facility] TELE 2 You were discharged on:  March 11, 2017 Unit phone number:  [phone] Why you were hospitalized Your primary diagnosis was:  Not on File Your diagnoses also included:  Anemia Providers Seen During Your Hospitalizations Provider Role Specialty Primary office phone Ines Monsalve MD Attending Provider Emergency Medicine [phone] Eulogio Rojas MD Attending Provider Family Practice [phone] Your Primary Care Physician (PCP) Primary Care Physician Office Phone Office Fax Critical access hospital [phone] Follow-up Information Follow up With Details Comments Contact Info Maria L Alegre MD   [address] 
[phone] [employer] Schedule an appointment as soon as possible for a visit  [address] [facility] [address] [place] [address] 
[phone] Current Discharge Medication List  
  
CONTINUE these medications which have NOT CHANGED Dose & Instructions Dispensing Information Comments Morning Noon Evening Bedtime APRI 0.15-0.03 mg Tab Generic drug:  desogestrel-ethinyl estradiol Your next dose is: Today, Tomorrow Other:  _________ Dose:  1 Tab Take 1 Tab by mouth daily. Refills:  0 CLARITIN 10 mg tablet Generic drug:  loratadine Your next dose is: Today, Tomorrow Other:  _________ Dose:  10 mg Take 10 mg by mouth daily as needed for Allergies. Refills:  0  
     
   
   
   
  
 ferrous sulfate 325 mg (65 mg iron) EC tablet Commonly known as:  IRON Your next dose is: Today, Tomorrow Other:  _________ Dose:  325 mg Take 1 Tab by mouth two (2) times daily (with meals). Quantity:  60 Tab Refills:  0  
     
   
   
   
  
 ibuprofen 800 mg tablet Commonly known as:  MOTRIN Your next dose is: Today, Tomorrow Other:  _________ Dose:  800 mg Take 1 Tab by mouth every eight (8) hours as needed for Pain. Quantity:  30 Tab Refills:  0 STOP taking these medications   
 estradiol 2 mg tablet Commonly known as:  ESTRACE Discharge Instructions HOME DISCHARGE INSTRUCTIONS Christophernickie Gladys / [id number] : 1985 Admission date: 3/9/2017 Discharge date: 3/11/2017 12:58 PM  
 
Please bring this form with you to show your care provider at your follow-up appointment. Primary care provider:  Garcia Ruiz MD 
 
Discharging provider: Bell Magaña DO  - Family Medicine Resident Dr. Luanne De Souza MD - Family Medicine Attending FINAL DIAGNOSES: 
Anemia [facility] . . . . . . . . . . . . . . . . . . . . . . . . . . . . . . . . . . . . . . . . . . . . . . . . . . . . . . . . . . . . . . . . . . . . . . [facility] FOLLOW-UP CARE RECOMMENDATIONS: 
 
Appointments · Follow-up with: Follow-up Information Follow up With Details Comments Contact Info Aditya Vo MD   [address] [facility]note 2] [facility] 
[phone] [facility] Schedule an appointment as soon as possible for a visit  [address] [facility] [address] [facility] [address] 
[phone] · Follow-up tests needed: Hgb Pending test results: At the time of your discharge the following test results are still pending: None. Please make sure you review these results with your outpatient follow-up provider(s). Specific symptoms to watch for: chest pain, shortness of breath, fever, chills, nausea, vomiting, diarrhea, change in mentation, falling, weakness, bleeding. DIET/what to eat:  Regular Diet ACTIVITY:  Activity as tolerated Wound care: None Equipment needed:  None What to do if new or unexpected symptoms occur? If you experience any of the above symptoms (or should other concerns or questions arise after discharge) please call your primary care physician. Return to the emergency room if you cannot get hold of your doctor. · It is very important that you keep your follow-up appointment(s). · Please bring discharge papers, medication list (and/or medication bottles) to your follow-up appointments for review by your outpatient provider(s). · Please check the list of medications and be sure it includes every medication (even non-prescription medications) that your provider wants you to take. · It is important that you take the medication exactly as they are prescribed. · Keep your medication in the bottles provided by the pharmacist and keep a list of the medication names, dosages, and times to be taken in your wallet. · Do not take other medications without consulting your doctor.   
· If you have any questions about your medications or other instructions, please talk to your nurse or care provider before you leave the hospital.  
 
 Information obtained by:  
 
I understand that if any problems occur once I am at home I am to contact my physician. These instructions were explained to me and I had the opportunity to ask questions. I understand and acknowledge receipt of the instructions indicated above. Physician's or R.N.'s Signature                                                                  Date/Time Patient or Representative Signature                                                          Date/Time Abnormal Uterine Bleeding: Care Instructions Your Care Instructions Abnormal uterine bleeding (AUB) is irregular bleeding from the uterus that is longer or heavier than usual or does not occur at your regular time. Sometimes it is caused by changes in hormone levels. It can also be caused by growths in the uterus, such as fibroids or polyps. Sometimes a cause cannot be found. You may have heavy bleeding when you are not expecting your period. Your doctor may suggest a pregnancy test, if you think you are pregnant. Follow-up care is a key part of your treatment and safety. Be sure to make and go to all appointments, and call your doctor if you are having problems. It's also a good idea to know your test results and keep a list of the medicines you take. How can you care for yourself at home? · Be safe with medicines. Take pain medicines exactly as directed. ¨ If the doctor gave you a prescription medicine for pain, take it as prescribed. ¨ If you are not taking a prescription pain medicine, ask your doctor if you can take an over-the-counter medicine. · You may be low in iron because of blood loss.  Eat a balanced diet that is high in iron and vitamin C. Foods rich in iron include red meat, shellfish, eggs, beans, and leafy green vegetables. Talk to your doctor about whether you need to take iron pills or a multivitamin. When should you call for help? Call 911 anytime you think you may need emergency care. For example, call if: 
· You passed out (lost consciousness). Call your doctor now or seek immediate medical care if: 
· You have sudden, severe pain in your belly or pelvis. · You have severe vaginal bleeding. You are soaking through your usual pads or tampons every hour for 2 or more hours. · You feel dizzy or lightheaded, or you feel like you may faint. Watch closely for changes in your health, and be sure to contact your doctor if: 
· You have new belly or pelvic pain. · You have a fever. · Your bleeding gets worse or lasts longer than 1 week. · You think you may be pregnant. Where can you learn more? Go to http://yumiko-maxine.info/. Enter N095 in the search box to learn more about \"Abnormal Uterine Bleeding: Care Instructions. \" Current as of: February 25, 2016 Content Version: 11.1 © 4107-9840 [employer]. Care instructions adapted under license by [employer] (which disclaims liability or warranty for this information). If you have questions about a medical condition or this instruction, always ask your healthcare professional. [place] [address] any warranty or liability for your use of this information. Anemia From Heavy Bleeding: Care Instructions Your Care Instructions Anemia means that your body does not have enough red blood cells. Red blood cells carry oxygen around the body. When you have anemia, you may feel dizzy, tired, and weak. You may also feel your heart pounding. For some people, it's hard to focus and think clearly. One common cause of anemia is bleeding.  Bleeding from ulcers, hemorrhoids, cancer, or other problems can cause anemia. It may also be caused by heavy menstrual periods. Your treatment may include iron pills. Iron helps your body make hemoglobin. Hemoglobin is the part of the red blood cell that carries oxygen. If you have severe anemia, you may need a blood transfusion to give you red blood cells as quickly as possible. Sometimes it takes several months to get iron levels back to normal. 
Follow-up care is a key part of your treatment and safety. Be sure to make and go to all appointments, and call your doctor if you are having problems. It's also a good idea to know your test results and keep a list of the medicines you take. How can you care for yourself at home? · Be safe with medicines. Take your medicines exactly as prescribed. Call your doctor if you think you are having a problem with your medicine. · Follow your doctor's advice about eating foods that have a lot of iron in them. These include red meat, shellfish, poultry, and eggs. They also include beans, raisins, whole-grain bread, and leafy green vegetables. · Steam your vegetables. This is the best way to prepare them if you want to get as much iron as possible. · Iron pills can cause constipation. If you take them, there are things you can do to avoid constipation. Drink plenty of fluids, eat foods with a lot of fiber, and exercise every day. When should you call for help? Call 911 anytime you think you may need emergency care. For example, call if: 
· You passed out (lost consciousness). · Your stools are maroon or very bloody. Call your doctor now or seek immediate medical care if: 
· You have new or worse trouble breathing. · You are dizzy or lightheaded, or you feel like you may faint. · You have any abnormal bleeding, such as: 
¨ Nosebleeds. ¨ Vaginal bleeding that is different (heavier, more frequent, at a different time of the month) than what you are used to. ¨ Bloody or black stools, or rectal bleeding. ¨ Bloody or pink urine. Watch closely for changes in your health, and be sure to contact your doctor if: 
· You feel weaker or more tired than usual. 
· You do not get better as expected. Where can you learn more? Go to http://yumiko-maxine.info/. Enter K875 in the search box to learn more about \"Anemia From Heavy Bleeding: Care Instructions. \" Current as of: June 17, 2016 Content Version: 11.1 © 1941-3574 [employer]. Care instructions adapted under license by [employer] (which disclaims liability or warranty for this information). If you have questions about a medical condition or this instruction, always ask your healthcare professional. [address] any warranty or liability for your use of this information. Anemia: Care Instructions Your Care Instructions Anemia is a low level of red blood cells, which carry oxygen throughout your body. Many things can cause anemia. Lack of iron is one of the most common causes. Your body needs iron to make hemoglobin, a substance in red blood cells that carries oxygen from the lungs to your body's cells. Without enough iron, the body produces fewer and smaller red blood cells. As a result, your body's cells do not get enough oxygen, and you feel tired and weak. And you may have trouble concentrating. Bleeding is the most common cause of a lack of iron. You may have heavy menstrual bleeding or bleeding caused by conditions such as ulcers, hemorrhoids, or cancer. Regular use of aspirin or other anti-inflammatory medicines (such as ibuprofen) also can cause bleeding in some people. A lack of iron in your diet also can cause anemia, especially at times when the body needs more iron, such as during pregnancy, infancy, and the teen years. Your doctor may have prescribed iron pills.  It may take several months of treatment for your iron levels to return to normal. Your doctor also may suggest that you eat foods that are rich in iron, such as meat and beans. There are many other causes of anemia. It is not always due to a lack of iron. Finding the specific cause of your anemia will help your doctor find the right treatment for you. Follow-up care is a key part of your treatment and safety. Be sure to make and go to all appointments, and call your doctor if you are having problems. It's also a good idea to know your test results and keep a list of the medicines you take. How can you care for yourself at home? · Take your medicines exactly as prescribed. Call your doctor if you think you are having a problem with your medicine. · If your doctor recommends iron pills, take them as directed: ¨ Try to take the pills on an empty stomach about 1 hour before or 2 hours after meals. But you may need to take iron with food to avoid an upset stomach. ¨ Do not take antacids or drink milk or caffeine drinks (such as coffee, tea, or cola) at the same time or within 2 hours of the time that you take your iron. They can make it hard for your body to absorb the iron. ¨ Vitamin C (from food or supplements) helps your body absorb iron. Try taking iron pills with a glass of orange juice or some other food that is high in vitamin C, such as citrus fruits. ¨ Iron pills may cause stomach problems, such as heartburn, nausea, diarrhea, constipation, and cramps. Be sure to drink plenty of fluids, and include fruits, vegetables, and fiber in your diet each day. Iron pills often make your bowel movements dark or green. ¨ If you forget to take an iron pill, do not take a double dose of iron the next time you take a pill. ¨ Keep iron pills out of the reach of small children. An overdose of iron can be very dangerous. · Follow your doctor's advice about eating iron-rich foods.  These include red meat, shellfish, poultry, eggs, beans, raisins, whole-grain bread, and leafy green vegetables. · Steam vegetables to help them keep their iron content. When should you call for help? Call 911 anytime you think you may need emergency care. For example, call if: 
· You have symptoms of a heart attack. These may include: ¨ Chest pain or pressure, or a strange feeling in the chest. 
¨ Sweating. ¨ Shortness of breath. ¨ Nausea or vomiting. ¨ Pain, pressure, or a strange feeling in the back, neck, jaw, or upper belly or in one or both shoulders or arms. ¨ Lightheadedness or sudden weakness. ¨ A fast or irregular heartbeat. After you call 911, the  may tell you to chew 1 adult-strength or 2 to 4 low-dose aspirin. Wait for an ambulance. Do not try to drive yourself. · You passed out (lost consciousness). Call your doctor now or seek immediate medical care if: 
· You have new or increased shortness of breath. · You are dizzy or lightheaded, or you feel like you may faint. · Your fatigue and weakness continue or get worse. · You have any abnormal bleeding, such as: 
¨ Nosebleeds. ¨ Vaginal bleeding that is different (heavier, more frequent, at a different time of the month) than what you are used to. ¨ Bloody or black stools, or rectal bleeding. ¨ Bloody or pink urine. Watch closely for changes in your health, and be sure to contact your doctor if: 
· You do not get better as expected. Where can you learn more? Go to http://yumiko-maxine.info/. Enter R301 in the search box to learn more about \"Anemia: Care Instructions. \" Current as of: February 5, 2016 Content Version: 11.1 © 8779-7833 [employer]. Care instructions adapted under license by [employer] (which disclaims liability or warranty for this information).  If you have questions about a medical condition or this instruction, always ask your healthcare professional. [employer] disclaims any warranty or liability for your use of this information. Discharge Orders None EventBuilder Announcement We are excited to announce that we are making your provider's discharge notes available to you in EventBuilder. You will see these notes when they are completed and signed by the physician that discharged you from your recent hospital stay. If you have any questions or concerns about any information you see in EventBuilder, please call the Health Information Department where you were seen or reach out to your Primary Care Provider for more information about your plan of care. Introducing [facility]! Dear Lavinia Shaw: Thank you for requesting a EventBuilder account. Our records indicate that you already have an active EventBuilder account. You can access your account anytime at [email or web address] Did you know that you can access your hospital and ER discharge instructions at any time in EventBuilder? You can also review all of your test results from your hospital stay or ER visit. Additional Information If you have questions, please visit the Frequently Asked Questions section of the EventBuilder website at [email or web address] Remember, EventBuilder is NOT to be used for urgent needs. For medical emergencies, dial 911. Now available from your iPhone and Android! General Information Please provide this summary of care documentation to your next provider. Patient Signature:  ____________________________________________________________ Date:  ____________________________________________________________  
  
Christel Castellon Provider Signature:  ____________________________________________________________ Date:  ____________________________________________________________

## 2017-03-09 NOTE — PROGRESS NOTES
[facility]: MED RECONCILIATION    Comments/Recommendations:     Pt never started taking NSAID. Denies taking other supplements, herbals, OTCs. Medications added:     · Estradiol 2mg daily x 10 days  · Apri- to start taking after estradiol completed    Medications removed:    · Sprintec    Medications adjusted:    · Claritin    Information obtained from: Patient, RxQuery, recent medical records      Allergies: Review of patient's allergies indicates no known allergies. Prior to Admission Medications:     Medication Documentation Review Audit       Reviewed by Traci Rosas. Jorge Lake (Pharmacist) on 03/09/17 at 1620         Medication Sig Documenting Provider Last Dose Status Taking?      desogestrel-ethinyl estradiol (APRI) 0.15-0.03 mg tab Take 1 Tab by mouth daily. Historical Provider NOT started yet Active Yes             Med Note (Alphonse Lacey je Mar 9, 2017  4:20 PM): To start after 10 days of estradiol (~3/18/17)      estradiol (ESTRACE) 2 mg tablet Take 2 mg by mouth daily. For 10 days starting 3/8/17, then transition to OCP Gladys Bandar) Historical Provider 3/9/2017 AM Active Yes    ferrous sulfate (IRON) 325 mg (65 mg iron) EC tablet Take 1 Tab by mouth two (2) times daily (with meals). Judith Kelley MD 3/9/2017 AM Active Yes    ibuprofen (MOTRIN) 800 mg tablet Take 1 Tab by mouth every eight (8) hours as needed for Pain. Judith Kelley MD  Active Yes    loratadine (CLARITIN) 10 mg tablet Take 10 mg by mouth daily as needed for Allergies. Historical Provider  Active Yes                    Thank you,  Jonas Long, Pharm. D.

## 2017-03-09 NOTE — ED TRIAGE NOTES
Patient arrives with EMS for complaint of vaginal bleeding. States that she was seen here Tuesday for the same. Had a recent change in birth control.

## 2017-03-10 LAB
ALBUMIN SERPL BCP-MCNC: 2.6 G/DL (ref 3.5–5)
ALBUMIN/GLOB SERPL: 0.8 {RATIO} (ref 1.1–2.2)
ALP SERPL-CCNC: 38 U/L (ref 45–117)
ALT SERPL-CCNC: 36 U/L (ref 12–78)
ANION GAP BLD CALC-SCNC: 10 MMOL/L (ref 5–15)
AST SERPL W P-5'-P-CCNC: 21 U/L (ref 15–37)
ATRIAL RATE: 138 BPM
BASOPHILS # BLD AUTO: 0 K/UL (ref 0–0.1)
BASOPHILS # BLD AUTO: 0 K/UL (ref 0–0.1)
BASOPHILS # BLD: 0 % (ref 0–1)
BASOPHILS # BLD: 0 % (ref 0–1)
BILIRUB SERPL-MCNC: 0.5 MG/DL (ref 0.2–1)
BUN SERPL-MCNC: 11 MG/DL (ref 6–20)
BUN/CREAT SERPL: 15 (ref 12–20)
CALCIUM SERPL-MCNC: 7.8 MG/DL (ref 8.5–10.1)
CALCULATED P AXIS, ECG09: 13 DEGREES
CALCULATED R AXIS, ECG10: 45 DEGREES
CALCULATED T AXIS, ECG11: -9 DEGREES
CHLORIDE SERPL-SCNC: 106 MMOL/L (ref 97–108)
CO2 SERPL-SCNC: 23 MMOL/L (ref 21–32)
CREAT SERPL-MCNC: 0.74 MG/DL (ref 0.55–1.02)
DIAGNOSIS, 93000: NORMAL
DIFFERENTIAL METHOD BLD: ABNORMAL
EOSINOPHIL # BLD: 0 K/UL (ref 0–0.4)
EOSINOPHIL # BLD: 0 K/UL (ref 0–0.4)
EOSINOPHIL NFR BLD: 0 % (ref 0–7)
EOSINOPHIL NFR BLD: 0 % (ref 0–7)
ERYTHROCYTE [DISTWIDTH] IN BLOOD BY AUTOMATED COUNT: 14.5 % (ref 11.5–14.5)
ERYTHROCYTE [DISTWIDTH] IN BLOOD BY AUTOMATED COUNT: 14.5 % (ref 11.5–14.5)
EST. AVERAGE GLUCOSE BLD GHB EST-MCNC: ABNORMAL MG/DL
FSH SERPL-ACNC: 1.3 MIU/ML
GLOBULIN SER CALC-MCNC: 3.1 G/DL (ref 2–4)
GLUCOSE SERPL-MCNC: 118 MG/DL (ref 65–100)
HBA1C MFR BLD: <3.5 % (ref 4.2–6.3)
HCT VFR BLD AUTO: 18.1 % (ref 35–47)
HCT VFR BLD AUTO: 18.4 % (ref 35–47)
HCT VFR BLD AUTO: 19.1 % (ref 35–47)
HCT VFR BLD AUTO: 20.3 % (ref 35–47)
HCT VFR BLD AUTO: 23.8 % (ref 35–47)
HGB BLD-MCNC: 5.8 G/DL (ref 11.5–16)
HGB BLD-MCNC: 6.2 G/DL (ref 11.5–16)
HGB BLD-MCNC: 6.2 G/DL (ref 11.5–16)
HGB BLD-MCNC: 6.7 G/DL (ref 11.5–16)
HGB BLD-MCNC: 7.8 G/DL (ref 11.5–16)
LH SERPL-ACNC: 1.7 MIU/ML
LYMPHOCYTES # BLD AUTO: 20 % (ref 12–49)
LYMPHOCYTES # BLD AUTO: 21 % (ref 12–49)
LYMPHOCYTES # BLD: 3.1 K/UL (ref 0.8–3.5)
LYMPHOCYTES # BLD: 3.3 K/UL (ref 0.8–3.5)
MCH RBC QN AUTO: 29.4 PG (ref 26–34)
MCH RBC QN AUTO: 29.7 PG (ref 26–34)
MCHC RBC AUTO-ENTMCNC: 32 G/DL (ref 30–36.5)
MCHC RBC AUTO-ENTMCNC: 32.8 G/DL (ref 30–36.5)
MCV RBC AUTO: 90.5 FL (ref 80–99)
MCV RBC AUTO: 91.9 FL (ref 80–99)
MONOCYTES # BLD: 0.7 K/UL (ref 0–1)
MONOCYTES # BLD: 1 K/UL (ref 0–1)
MONOCYTES NFR BLD AUTO: 5 % (ref 5–13)
MONOCYTES NFR BLD AUTO: 6 % (ref 5–13)
NEUTS SEG # BLD: 10.9 K/UL (ref 1.8–8)
NEUTS SEG # BLD: 12 K/UL (ref 1.8–8)
NEUTS SEG NFR BLD AUTO: 74 % (ref 32–75)
NEUTS SEG NFR BLD AUTO: 74 % (ref 32–75)
P-R INTERVAL, ECG05: 120 MS
PATH REV BLD -IMP: ABNORMAL
PLATELET # BLD AUTO: 255 K/UL (ref 150–400)
PLATELET # BLD AUTO: 306 K/UL (ref 150–400)
POTASSIUM SERPL-SCNC: 4.1 MMOL/L (ref 3.5–5.1)
PROT SERPL-MCNC: 5.7 G/DL (ref 6.4–8.2)
Q-T INTERVAL, ECG07: 310 MS
QRS DURATION, ECG06: 70 MS
QTC CALCULATION (BEZET), ECG08: 469 MS
RBC # BLD AUTO: 1.97 M/UL (ref 3.8–5.2)
RBC # BLD AUTO: 2.63 M/UL (ref 3.8–5.2)
RBC MORPH BLD: ABNORMAL
SODIUM SERPL-SCNC: 139 MMOL/L (ref 136–145)
VENTRICULAR RATE, ECG03: 138 BPM
WBC # BLD AUTO: 14.7 K/UL (ref 3.6–11)
WBC # BLD AUTO: 16.2 K/UL (ref 3.6–11)

## 2017-03-10 PROCEDURE — 36415 COLL VENOUS BLD VENIPUNCTURE: CPT | Performed by: FAMILY MEDICINE

## 2017-03-10 PROCEDURE — 83036 HEMOGLOBIN GLYCOSYLATED A1C: CPT | Performed by: FAMILY MEDICINE

## 2017-03-10 PROCEDURE — 30233N1 TRANSFUSION OF NONAUTOLOGOUS RED BLOOD CELLS INTO PERIPHERAL VEIN, PERCUTANEOUS APPROACH: ICD-10-PCS | Performed by: FAMILY MEDICINE

## 2017-03-10 PROCEDURE — 65660000000 HC RM CCU STEPDOWN

## 2017-03-10 PROCEDURE — P9016 RBC LEUKOCYTES REDUCED: HCPCS | Performed by: EMERGENCY MEDICINE

## 2017-03-10 PROCEDURE — 74011000258 HC RX REV CODE- 258: Performed by: OBSTETRICS & GYNECOLOGY

## 2017-03-10 PROCEDURE — 74011250637 HC RX REV CODE- 250/637: Performed by: STUDENT IN AN ORGANIZED HEALTH CARE EDUCATION/TRAINING PROGRAM

## 2017-03-10 PROCEDURE — 74011250636 HC RX REV CODE- 250/636: Performed by: STUDENT IN AN ORGANIZED HEALTH CARE EDUCATION/TRAINING PROGRAM

## 2017-03-10 PROCEDURE — 36430 TRANSFUSION BLD/BLD COMPNT: CPT

## 2017-03-10 PROCEDURE — 83498 ASY HYDROXYPROGESTERONE 17-D: CPT | Performed by: FAMILY MEDICINE

## 2017-03-10 PROCEDURE — 77030029131 HC ADMN ST IV BLD N DEHP ICUM -B

## 2017-03-10 PROCEDURE — 74011250636 HC RX REV CODE- 250/636: Performed by: OBSTETRICS & GYNECOLOGY

## 2017-03-10 PROCEDURE — 85014 HEMATOCRIT: CPT

## 2017-03-10 PROCEDURE — 77030033269 HC SLV COMPR SCD KNE2 CARD -B

## 2017-03-10 PROCEDURE — 85025 COMPLETE CBC W/AUTO DIFF WBC: CPT

## 2017-03-10 PROCEDURE — 80053 COMPREHEN METABOLIC PANEL: CPT

## 2017-03-10 RX ORDER — METOCLOPRAMIDE HYDROCHLORIDE 5 MG/ML
5 INJECTION INTRAMUSCULAR; INTRAVENOUS EVERY 6 HOURS
Status: DISCONTINUED | OUTPATIENT
Start: 2017-03-10 | End: 2017-03-11 | Stop reason: HOSPADM

## 2017-03-10 RX ORDER — SODIUM CHLORIDE 9 MG/ML
250 INJECTION, SOLUTION INTRAVENOUS AS NEEDED
Status: DISCONTINUED | OUTPATIENT
Start: 2017-03-10 | End: 2017-03-11 | Stop reason: HOSPADM

## 2017-03-10 RX ADMIN — METOCLOPRAMIDE 5 MG: 5 INJECTION, SOLUTION INTRAMUSCULAR; INTRAVENOUS at 12:46

## 2017-03-10 RX ADMIN — SODIUM CHLORIDE 185 ML/HR: 900 INJECTION, SOLUTION INTRAVENOUS at 07:02

## 2017-03-10 RX ADMIN — Medication 10 ML: at 12:53

## 2017-03-10 RX ADMIN — SODIUM CHLORIDE 25 MG: 900 INJECTION, SOLUTION INTRAVENOUS at 12:39

## 2017-03-10 RX ADMIN — Medication 10 ML: at 21:11

## 2017-03-10 RX ADMIN — METOCLOPRAMIDE 5 MG: 5 INJECTION, SOLUTION INTRAMUSCULAR; INTRAVENOUS at 07:01

## 2017-03-10 RX ADMIN — SODIUM CHLORIDE 25 MG: 900 INJECTION, SOLUTION INTRAVENOUS at 21:11

## 2017-03-10 RX ADMIN — SODIUM CHLORIDE 25 MG: 900 INJECTION, SOLUTION INTRAVENOUS at 16:39

## 2017-03-10 RX ADMIN — METOCLOPRAMIDE 5 MG: 5 INJECTION, SOLUTION INTRAMUSCULAR; INTRAVENOUS at 18:35

## 2017-03-10 RX ADMIN — SODIUM CHLORIDE 185 ML/HR: 900 INJECTION, SOLUTION INTRAVENOUS at 12:47

## 2017-03-10 RX ADMIN — Medication 325 MG: at 18:35

## 2017-03-10 RX ADMIN — Medication 10 ML: at 06:25

## 2017-03-10 RX ADMIN — Medication 325 MG: at 08:59

## 2017-03-10 RX ADMIN — SODIUM CHLORIDE 25 MG: 900 INJECTION, SOLUTION INTRAVENOUS at 08:33

## 2017-03-10 RX ADMIN — Medication 10 ML: at 01:43

## 2017-03-10 NOTE — PROGRESS NOTES
.Primary Nurse Reji Mclaughlin, RN and MICHELLE Hanson,RN, RN performed a dual skin assessment on this patient No impairment noted  Johan score is 22

## 2017-03-10 NOTE — PROGRESS NOTES
Problem: Falls - Risk of  Goal: *Absence of falls  Outcome: Progressing Towards Goal  Bed wheels locked, bed in low position. Side rails x 3. Call bell and possessions within reach. Asked patient to call for assistance. Patient verbalized understanding. Will continue to monitor. 2663  Patient rounding. Resting quietly. 1145  Blood drawn and sent to lab. 1245  Medication pass. Patient resting quietly. 1510  Provided juice and ice. Will continue to monitor. [address] at bedside. 1722  Patient bathed. Linen changed. [place] [address]  Dr. Katelin Galvan with patient. 1907  Bedside and Verbal shift change report given to Meghan Rico RN (oncoming nurse) by SUN TOMPKINS (offgoing nurse). Report included the following information SBAR, Kardex, Intake/Output and Recent Results. Sinus Tach rhythm.

## 2017-03-10 NOTE — CDMP QUERY
The diagnosis of acute anemia has been documented for this patient. The following is also found on the medical record:    * Diagnosed with menometrorrhagia  * Admitting H/H 5.8/18.1  * Transfused 3 units PRBCs  * Repeat H/H 7.8/23.8    Based on your medical judgment, could your documentation be further specified as     =>Acute blood loss anemia   =>Other Explanation of clinical findings  =>Unable to Determine (no explanation of clinical findings)    Please clarify and document your clinical opinion in the progress notes and discharge summary including the definitive and/or presumptive diagnosis, (suspected or probable), related to the above clinical findings. Please include clinical findings supporting your diagnosis.     [facility], [address] [place] [address]  [email or web address]

## 2017-03-10 NOTE — PROGRESS NOTES
Checked on patient for Ultrasound at 19:34, then again at 20:23. Patient not ready for ultrasound nurse will call when ready.

## 2017-03-10 NOTE — PROGRESS NOTES
Bedside, Verbal and  shift change report given to KASHMIR Witt (oncoming nurse) by Syed Dias (offgoing nurse). Report included the following information SBAR, Kardex, Intake/Output, MAR, Recent Results and Cardiac Rhythm Sinus tach. Ximena Washington

## 2017-03-10 NOTE — PROGRESS NOTES
I met with patient and discussed role of case management. Pt lives with her mother Woodrow Ayala, she can be reached on the home phone - [phone]. Helen is the pt's main contact. 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 Pt has prescription coverage under her insurance plan, she gets her prescriptions [facility] at [facility]. Pt's PCP is Dr. James Abebe. Pt has never had home health before and does not have any DME. No other issues or concerns at this time. Thanks Gustavo Sanz MSW  Care Management Interventions  PCP Verified by CM:  Yes  MyChart Signup: No  Discharge Durable Medical Equipment: No  Physical Therapy Consult: No  Occupational Therapy Consult: No  Speech Therapy Consult: No  Current Support Network: Relative's Home  Confirm Follow Up Transport: Family  Plan discussed with Pt/Family/Caregiver: Yes  Freedom of Choice Offered: Yes  Discharge Location  Discharge Placement: Home

## 2017-03-10 NOTE — PROGRESS NOTES
Discussed with OB hospitalist (Dr. Jhonny Silva)    Hgb 6.7 from post-transfusion (Recieved 3 units) Hgb of 7.8. Currently receiving Premarin 25 mg q4h. Has received 3 doses so far. Bleeding continues but less. Pt is hemodynamically stable. As Hgb has dropped and pt continued to bleed, will give 1 unit pRBC now, stay 1 unit ahead.      Will follow-up H/H      Susana Appiah, DO

## 2017-03-10 NOTE — CONSULTS
Gyn Consult    Subjective:     Zack Gordon is a 36 y.o.  premenopausal female who is being seen for dysfunctional menorrhagia. She was seen in the office on Tuesday and started on OCPs    OB/GYN ROS:  abnormal bleeding, pelvic pain or discharge, no breast pain or new or enlarging lumps on self exam, she complains of prolonged vaginal bleeding. OB/GYN history: contraception (OCP (Oral Contraceptive Pills))    Patient Active Problem List    Diagnosis Date Noted    Anemia 2017    Microscopic hematuria 07/15/2016    Obesity, Class III, BMI 40-49.9 (morbid obesity) ([facility] [address] 01/15/2015    Pre-diabetes      Past Medical History:   Diagnosis Date    Heart murmur of      HLD (hyperlipidemia)     Pre-diabetes       Past Surgical History:   Procedure Laterality Date    HX WISDOM TEETH EXTRACTION        Social History   Substance Use Topics    Smoking status: Former Smoker     Packs/day: 0.25     Years: 10.00     Types: Cigarettes     Quit date: 2014    Smokeless tobacco: Never Used    Alcohol use 3.0 oz/week     5 Standard drinks or equivalent per week      Comment: social      Family History   Problem Relation Age of Onset    Heart Disease Mother     Hypertension Mother     Psychiatric Disorder Mother     Diabetes Father     Heart Disease Father     Psychiatric Disorder Sister     Cancer Maternal Grandmother      Lung cancer    Cancer Maternal Grandfather     Diabetes Other      Paternal great-grandmother   Roshan Garrido Other      family members with kidney stones      Prior to Admission Medications   Prescriptions Last Dose Informant Patient Reported? Taking?   desogestrel-ethinyl estradiol (APRI) 0.15-0.03 mg tab NOT started yet  Yes Yes   Sig: Take 1 Tab by mouth daily. estradiol (ESTRACE) 2 mg tablet 3/9/2017 at AM  Yes Yes   Sig: Take 2 mg by mouth daily.  For 10 days starting 3/8/17, then transition to OCP (Apri)   ferrous sulfate (IRON) 325 mg (65 mg iron) EC tablet 3/9/2017 at AM  No Yes   Sig: Take 1 Tab by mouth two (2) times daily (with meals). ibuprofen (MOTRIN) 800 mg tablet   No Yes   Sig: Take 1 Tab by mouth every eight (8) hours as needed for Pain.   loratadine (CLARITIN) 10 mg tablet   Yes Yes   Sig: Take 10 mg by mouth daily as needed for Allergies. Facility-Administered Medications: None     No Known Allergies     Review of Systems:  10 point ROS,  A comprehensive review of systems was negative except for that written in the History of Present Illness.      Objective:     Patient Vitals for the past 8 hrs:   BP Temp Pulse Resp SpO2   03/10/17 0538 137/70 98.8 °F (37.1 °C) (!) 111 18 100 %   03/10/17 0440 141/68 98.4 °F (36.9 °C) 100 18 100 %   03/10/17 0416 130/61 98.5 °F (36.9 °C) (!) 105 16 100 %   03/10/17 0355 126/66 98.8 °F (37.1 °C) (!) 106 18 100 %   03/10/17 0323 135/72 98.6 °F (37 °C) (!) 113 16 99 %   03/10/17 0236 124/69 98.6 °F (37 °C) (!) 112 18 98 %   03/10/17 0140 122/67 98.8 °F (37.1 °C) (!) 109 20 99 %   03/10/17 0129 - - (!) 113 - -   03/10/17 0100 128/69 - - - 99 %   03/10/17 0054 111/60 98 °F (36.7 °C) (!) 109 15 99 %   03/10/17 0045 118/74 - - - 96 %   03/10/17 0039 121/75 98.2 °F (36.8 °C) (!) 104 15 98 %   03/10/17 0015 96/59 98.6 °F (37 °C) (!) 115 16 99 %   17 2345 100/61 - - - 99 %   17 2315 110/58 97.9 °F (36.6 °C) (!) 123 14 99 %   17 2245 101/60 - - - 99 %     Temp (24hrs), Av.4 °F (36.9 °C), Min:97.9 °F (36.6 °C), Max:98.8 °F (37.1 °C)     1901 - 03/10 0700  In: 652 [I.V.:629]  Out: 0     Physical Exam:   Pelvic: External genitalia normal, Vagina normal without discharge, cervix normal in appearance, no CMT, uterus normal size, shape, and consistency, no adnexal masses or tenderness, rectovaginal septum normal    Labs:    Recent Results (from the past 24 hour(s))   CBC WITH AUTOMATED DIFF    Collection Time: 17  2:58 PM   Result Value Ref Range    WBC 14.7 (H) 3.6 - 11.0 K/uL    RBC 1.97 (L) 3.80 - 5.20 M/uL    HGB 5.8 (LL) 11.5 - 16.0 g/dL    HCT 18.1 (L) 35.0 - 47.0 %    MCV 91.9 80.0 - 99.0 FL    MCH 29.4 26.0 - 34.0 PG    MCHC 32.0 30.0 - 36.5 g/dL    RDW 14.5 11.5 - 14.5 %    PLATELET 902 196 - 192 K/uL    NEUTROPHILS 74 32 - 75 %    LYMPHOCYTES 21 12 - 49 %    MONOCYTES 5 5 - 13 %    EOSINOPHILS 0 0 - 7 %    BASOPHILS 0 0 - 1 %    ABS. NEUTROPHILS 10.9 (H) 1.8 - 8.0 K/UL    ABS. LYMPHOCYTES 3.1 0.8 - 3.5 K/UL    ABS. MONOCYTES 0.7 0.0 - 1.0 K/UL    ABS. EOSINOPHILS 0.0 0.0 - 0.4 K/UL    ABS.  BASOPHILS 0.0 0.0 - 0.1 K/UL    DF SMEAR SCANNED      RBC COMMENTS ANISOCYTOSIS  1+        RBC COMMENTS MACROCYTOSIS  1+        RBC COMMENTS POLYCHROMASIA  PRESENT       METABOLIC PANEL, BASIC    Collection Time: 03/09/17  2:58 PM   Result Value Ref Range    Sodium 139 136 - 145 mmol/L    Potassium 3.8 3.5 - 5.1 mmol/L    Chloride 105 97 - 108 mmol/L    CO2 22 21 - 32 mmol/L    Anion gap 12 5 - 15 mmol/L    Glucose 145 (H) 65 - 100 mg/dL    BUN 7 6 - 20 MG/DL    Creatinine 0.93 0.55 - 1.02 MG/DL    BUN/Creatinine ratio 8 (L) 12 - 20      GFR est AA >60 >60 ml/min/1.73m2    GFR est non-AA >60 >60 ml/min/1.73m2    Calcium 8.3 (L) 8.5 - 10.1 MG/DL   TYPE & SCREEN    Collection Time: 03/09/17  3:16 PM   Result Value Ref Range    Crossmatch Expiration 03/12/2017     ABO/Rh(D) A POSITIVE     Antibody screen NEG     Unit number [id number]     Blood component type RC LR AS1     Unit division 00     Status of unit ISSUED     Crossmatch result Compatible     Unit number [id number]     Blood component type RC LR AS1     Unit division 00     Status of unit ISSUED     Crossmatch result Compatible     Unit number [id number]     Blood component type RC LR AS1     Unit division 00     Status of unit ISSUED     Crossmatch result Compatible     Unit number [id number]     Blood component type RC LR AS1     Unit division 00     Status of unit ALLOCATED     Crossmatch result Compatible     Unit number [id number]     Blood component type RC LR AS1     Unit division 00     Status of unit ALLOCATED     Crossmatch result Compatible    EKG, 12 LEAD, INITIAL    Collection Time: 03/09/17  3:22 PM   Result Value Ref Range    Ventricular Rate 138 BPM    Atrial Rate 138 BPM    P-R Interval 120 ms    QRS Duration 70 ms    Q-T Interval 310 ms    QTC Calculation (Bezet) 469 ms    Calculated P Axis 13 degrees    Calculated R Axis 45 degrees    Calculated T Axis -9 degrees    Diagnosis       Sinus tachycardia  Otherwise normal ECG  No previous ECGs available     TSH 3RD GENERATION    Collection Time: 03/09/17  8:10 PM   Result Value Ref Range    TSH 3.14 0.36 - 3.74 uIU/mL   HGB & HCT    Collection Time: 03/10/17 12:29 AM   Result Value Ref Range    HGB 6.2 (L) 11.5 - 16.0 g/dL    HCT 19.1 (L) 35.0 - 47.0 %       Imaging:  and ultrasound negative    Assessment:     Active Problems:    Anemia (3/9/2017)        Plan:     I reviewed with Geraldine Posey her medical records, physical exam, and review of symptoms.    {Dysfunctional uterine bleeding  Plan transfusion  IV premarin 25 mg   IV reglan 10 mg       Signed By: Lyric Kate MD     March 10, 2017

## 2017-03-10 NOTE — PROGRESS NOTES
Nutrition:     Received MST for unintended wt loss pta. Chart reviewed. Admitted for anemia. Class III obesity per BMI. Visited pt this afternoon. Denies recent wt change. NPO. States MD started Cardiac diet this afternoon (dinner will be 1st meal of the day). Good appetite. Eating 3 meals/day at home. Diet appropriate. Nutrition assessment not indicated at this time. Will f/u with wt loss diet education once po intake assessed. Will be available by consult prn.         Jailyn Wolfe, [address]  Pager [phone]

## 2017-03-10 NOTE — PROGRESS NOTES
[id number] [facility] RESIDENCY PROGRAM  PROGRESS NOTE     3/10/2017  PCP: Jody Jose MD     Assessment/Plan:   Jaky Brock is a 36 y.o. female with history of obesity, irregular periods, pre-diabetes, HLD and anemia who is admitted for symptomatic anemia secondary to a vaginal bleed. .        Acute anemia secondary to menometrorrhagia:  Hgb in the ER 5.8 (baseline ~ 12.5). S/p 3 units PRBCs. TSH: 3.14  US pelvis incomplete due to pain.   - H&H q6hr after transfusion.  - IVF, consider another dose of Premarin  - NPO  - f/u ob hospitalist recs  - continue iron sulfate  - Discontinue the following meds: Motrin  - SCD, ambulate with assistance  - labs: CBC, CMP, FSH, LH, hcg, INR     H/o irregular periods: possible dysovulation, PCOS   - f/u with OB gyn recs      H/o prediabetes: Last A1c: 5.6, not in prediabetic range.     HLP: stable not on meds. Most recent lipid panel on 16. Chol T: 169, HDL:36, LDL: 93, T     Obesity: BMI: 47.   - Advise weight loss        FEN/GI - NPO. NS at 190 mL/hr. Activity - Ambulate with assistance  DVT prophylaxis - SCDs  GI prophylaxis - Protonix  Disposition - Admit to Telemetry. Plan to d/c to Home. Code Status - Full, discussed with patient / caregivers.     Patient Jaky Brock to be discussed Dr. Arnie Montes (Attending Physician).        Subjective:   Pt was seen and examined at bedside. Afebrile and hemodynamically stable. Concerns overnight include: continuous. Denies chest pain, SOB, nausea, vomiting, abdominal pain, dizziness.      Objective:   Physical examination  Visit Vitals    /68 (BP 1 Location: Left arm, BP Patient Position: At rest)    Pulse 100    Temp 98.4 °F (36.9 °C)    Resp 18    Ht 5' 9\" (1.753 m)    Wt 320 lb (145.2 kg)    LMP 2017    SpO2 100%    Breastfeeding No    BMI 47.26 kg/m2      Temp (24hrs), Av.4 °F (36.9 °C), Min:97.9 °F (36.6 °C), Max:98.8 °F (37.1 °C)     O2 Flow Rate (L/min): 2 l/min   O2 Device: Nasal cannula      Date 03/09/17 0700 - 03/10/17 0659 03/10/17 0700 - 03/11/17 0659   Shift 0700-1859 1900-0659 24 Hour Total 0700-1859 1900-0659 24 Hour Total   I  N  T  A  K  E   P. O.  0 0         P. O.  0 0       I.V.  (mL/kg/hr)  629 629         Volume (0.9% sodium chloride infusion)  629 629       Blood  23 23         Volume (TRANSFUSE PACKED RBC'S)  23 23         Volume (TRANSFUSE PACKED RBC'S)  0 0       Shift Total  (mL/kg)  652  (4.5) 652  (4.5)      O  U  T  P  U  T   Urine  (mL/kg/hr)  0 0         Urine Voided  0 0       Shift Total  (mL/kg)  0  (0) 0  (0)      NET  652 652      Weight (kg) 145. 1 145. 1 145. 1 145. 1 145. 1 145. 1         Last 3 shifts:         General:   Alert, cooperative, no acute distress, obese   Head:   Atraumatic   Eyes:   Conjunctivae clear   ENT:  Oral mucosa normal   Neck:  Supple, trachea midline, no adenopathy   No JVD   Back:    No CVA tenderness    Chest wall:    No tenderness or deformities    Lungs:   Clear to auscultation bilaterally    Heart:   Regular rhythm, no murmur   Abdomen:    Soft, non-tender   No masses or organomegaly    Extremities:  No edema or DVT signs   Pulses:  Symmetric all extremities   Skin:  Warm and dry    No rashes or lesions   Neurologic:  Oriented   No focal deficits         Data Review:     Recent Labs      03/10/17   0029  03/09/17   1458  03/07/17   1814   WBC   --   14.7*  11.4*   HGB  6.2*  5.8*  8.5*   HCT  19.1*  18.1*  25.9*   PLT   --   306  208     Recent Labs      03/09/17   1458  03/07/17   1814   NA  139  139   K  3.8  4.2   CL  105  105   CO2  22  24   GLU  145*  104*   BUN  7  8   CREA  0.93  0.76   CA  8.3*  8.1*     Medications reviewed  Current Facility-Administered Medications   Medication Dose Route Frequency    ferrous sulfate tablet 325 mg  325 mg Oral BID WITH MEALS    loratadine (CLARITIN) tablet 10 mg  10 mg Oral DAILY PRN    0.9% sodium chloride infusion 250 mL  250 mL IntraVENous PRN    sodium chloride (NS) flush 5-10 mL  5-10 mL IntraVENous Q8H    sodium chloride (NS) flush 5-10 mL  5-10 mL IntraVENous PRN    diphenhydrAMINE (BENADRYL) capsule 25 mg  25 mg Oral Q4H PRN    0.9% sodium chloride infusion  185 mL/hr IntraVENous CONTINUOUS    sodium chloride 0.9 % bolus infusion 1,000 mL  1,000 mL IntraVENous ONCE         Signed:   Gwen Conrad MD   Resident, Family Medicine      Attending note: Attending note to follow. ..

## 2017-03-11 ENCOUNTER — APPOINTMENT (OUTPATIENT)
Dept: ULTRASOUND IMAGING | Age: 32
DRG: 812 | End: 2017-03-11
Attending: STUDENT IN AN ORGANIZED HEALTH CARE EDUCATION/TRAINING PROGRAM
Payer: COMMERCIAL

## 2017-03-11 VITALS
HEART RATE: 102 BPM | RESPIRATION RATE: 20 BRPM | OXYGEN SATURATION: 99 % | SYSTOLIC BLOOD PRESSURE: 121 MMHG | BODY MASS INDEX: 43.4 KG/M2 | WEIGHT: 293 LBS | TEMPERATURE: 98.1 F | DIASTOLIC BLOOD PRESSURE: 70 MMHG | HEIGHT: 69 IN

## 2017-03-11 LAB
ALBUMIN SERPL BCP-MCNC: 2.2 G/DL (ref 3.5–5)
ALBUMIN/GLOB SERPL: 0.8 {RATIO} (ref 1.1–2.2)
ALP SERPL-CCNC: 29 U/L (ref 45–117)
ALT SERPL-CCNC: 35 U/L (ref 12–78)
ANION GAP BLD CALC-SCNC: 7 MMOL/L (ref 5–15)
AST SERPL W P-5'-P-CCNC: 19 U/L (ref 15–37)
BILIRUB SERPL-MCNC: 0.4 MG/DL (ref 0.2–1)
BUN SERPL-MCNC: 6 MG/DL (ref 6–20)
BUN/CREAT SERPL: 10 (ref 12–20)
CALCIUM SERPL-MCNC: 7.4 MG/DL (ref 8.5–10.1)
CHLORIDE SERPL-SCNC: 109 MMOL/L (ref 97–108)
CHOLEST SERPL-MCNC: 126 MG/DL
CO2 SERPL-SCNC: 23 MMOL/L (ref 21–32)
CREAT SERPL-MCNC: 0.63 MG/DL (ref 0.55–1.02)
ERYTHROCYTE [DISTWIDTH] IN BLOOD BY AUTOMATED COUNT: 15.2 % (ref 11.5–14.5)
GLOBULIN SER CALC-MCNC: 2.9 G/DL (ref 2–4)
GLUCOSE SERPL-MCNC: 97 MG/DL (ref 65–100)
HCT VFR BLD AUTO: 21.3 % (ref 35–47)
HCT VFR BLD AUTO: 21.9 % (ref 35–47)
HDLC SERPL-MCNC: 34 MG/DL
HDLC SERPL: 3.7 {RATIO} (ref 0–5)
HGB BLD-MCNC: 7.1 G/DL (ref 11.5–16)
HGB BLD-MCNC: 7.5 G/DL (ref 11.5–16)
LDLC SERPL CALC-MCNC: 62.6 MG/DL (ref 0–100)
LIPID PROFILE,FLP: NORMAL
MCH RBC QN AUTO: 30 PG (ref 26–34)
MCHC RBC AUTO-ENTMCNC: 33.3 G/DL (ref 30–36.5)
MCV RBC AUTO: 89.9 FL (ref 80–99)
PLATELET # BLD AUTO: 282 K/UL (ref 150–400)
POTASSIUM SERPL-SCNC: 4 MMOL/L (ref 3.5–5.1)
PROT SERPL-MCNC: 5.1 G/DL (ref 6.4–8.2)
RBC # BLD AUTO: 2.37 M/UL (ref 3.8–5.2)
SODIUM SERPL-SCNC: 139 MMOL/L (ref 136–145)
TRIGL SERPL-MCNC: 147 MG/DL (ref ?–150)
VLDLC SERPL CALC-MCNC: 29.4 MG/DL
WBC # BLD AUTO: 12.3 K/UL (ref 3.6–11)

## 2017-03-11 PROCEDURE — 74011000258 HC RX REV CODE- 258: Performed by: OBSTETRICS & GYNECOLOGY

## 2017-03-11 PROCEDURE — 85018 HEMOGLOBIN: CPT

## 2017-03-11 PROCEDURE — 85027 COMPLETE CBC AUTOMATED: CPT | Performed by: FAMILY MEDICINE

## 2017-03-11 PROCEDURE — 74011250636 HC RX REV CODE- 250/636: Performed by: STUDENT IN AN ORGANIZED HEALTH CARE EDUCATION/TRAINING PROGRAM

## 2017-03-11 PROCEDURE — 80061 LIPID PANEL: CPT | Performed by: FAMILY MEDICINE

## 2017-03-11 PROCEDURE — 74011250637 HC RX REV CODE- 250/637: Performed by: STUDENT IN AN ORGANIZED HEALTH CARE EDUCATION/TRAINING PROGRAM

## 2017-03-11 PROCEDURE — 80053 COMPREHEN METABOLIC PANEL: CPT | Performed by: FAMILY MEDICINE

## 2017-03-11 PROCEDURE — 77030029131 HC ADMN ST IV BLD N DEHP ICUM -B

## 2017-03-11 PROCEDURE — 30233N1 TRANSFUSION OF NONAUTOLOGOUS RED BLOOD CELLS INTO PERIPHERAL VEIN, PERCUTANEOUS APPROACH: ICD-10-PCS | Performed by: FAMILY MEDICINE

## 2017-03-11 PROCEDURE — 36415 COLL VENOUS BLD VENIPUNCTURE: CPT | Performed by: FAMILY MEDICINE

## 2017-03-11 PROCEDURE — 74011250636 HC RX REV CODE- 250/636: Performed by: OBSTETRICS & GYNECOLOGY

## 2017-03-11 RX ADMIN — SODIUM CHLORIDE 25 MG: 900 INJECTION, SOLUTION INTRAVENOUS at 09:06

## 2017-03-11 RX ADMIN — SODIUM CHLORIDE 25 MG: 900 INJECTION, SOLUTION INTRAVENOUS at 12:21

## 2017-03-11 RX ADMIN — METOCLOPRAMIDE 5 MG: 5 INJECTION, SOLUTION INTRAMUSCULAR; INTRAVENOUS at 11:49

## 2017-03-11 RX ADMIN — SODIUM CHLORIDE 25 MG: 900 INJECTION, SOLUTION INTRAVENOUS at 05:52

## 2017-03-11 RX ADMIN — SODIUM CHLORIDE 25 MG: 900 INJECTION, SOLUTION INTRAVENOUS at 02:04

## 2017-03-11 RX ADMIN — Medication 325 MG: at 09:06

## 2017-03-11 RX ADMIN — Medication 10 ML: at 05:52

## 2017-03-11 RX ADMIN — SODIUM CHLORIDE 185 ML/HR: 900 INJECTION, SOLUTION INTRAVENOUS at 10:02

## 2017-03-11 RX ADMIN — METOCLOPRAMIDE 5 MG: 5 INJECTION, SOLUTION INTRAMUSCULAR; INTRAVENOUS at 02:03

## 2017-03-11 RX ADMIN — SODIUM CHLORIDE 185 ML/HR: 900 INJECTION, SOLUTION INTRAVENOUS at 04:17

## 2017-03-11 RX ADMIN — METOCLOPRAMIDE 5 MG: 5 INJECTION, SOLUTION INTRAMUSCULAR; INTRAVENOUS at 05:52

## 2017-03-11 NOTE — CONSULTS
Gynecology History and Physical    Name: Francine Styles MRN: [id number] SSN: [id number]    YOB: 1985  Age: 36 y.o. Sex: female       Subjective:      Chief complaint: dysfunctional uterine bleeding    Johanna Hodges is a 36 y.o.  obese female admitted to hospital for prolonged vaginal bleeding and acute anemia. She has received IV premarin 25 mg IV q4hrs since 3/10. Also, status post blood transfusions. She continues to have vaginal bleeding but now bleeding profile decreasing. Patient feels better. Denies any weakness, SOB, palpitations nor abdominal pain. GYN consulted today to provide further management of DUB. Reviewed vital signs, hemoglobin/hematocrit. OB History     No data available        Past Medical History:   Diagnosis Date    Heart murmur of      HLD (hyperlipidemia)     Pre-diabetes      Past Surgical History:   Procedure Laterality Date    HX WISDOM TEETH EXTRACTION       Social History     Occupational History    [employer]    Student      Goes to [employer] for health services      Social History Main Topics    Smoking status: Former Smoker     Packs/day: 0.25     Years: 10.00     Types: Cigarettes     Quit date: 2014    Smokeless tobacco: Never Used    Alcohol use 3.0 oz/week     5 Standard drinks or equivalent per week      Comment: social    Drug use: No    Sexual activity: Yes     Birth control/ protection: Condom     Family History   Problem Relation Age of Onset    Heart Disease Mother     Hypertension Mother     Psychiatric Disorder Mother     Diabetes Father     Heart Disease Father     Psychiatric Disorder Sister     Cancer Maternal Grandmother      Lung cancer    Cancer Maternal Grandfather     Diabetes Other      Paternal great-grandmother   Sohail Camejo Other      family members with kidney stones        No Known Allergies  Prior to Admission medications    Medication Sig Start Date End Date Taking?  Authorizing Provider loratadine (CLARITIN) 10 mg tablet Take 10 mg by mouth daily as needed for Allergies. Yes Historical Provider   estradiol (ESTRACE) 2 mg tablet Take 2 mg by mouth daily. For 10 days starting 3/8/17, then transition to OCP (Apri)   Yes Historical Provider   desogestrel-ethinyl estradiol (APRI) 0.15-0.03 mg tab Take 1 Tab by mouth daily. Yes Historical Provider   ferrous sulfate (IRON) 325 mg (65 mg iron) EC tablet Take 1 Tab by mouth two (2) times daily (with meals). 2/28/17  Yes Jericho Hunt MD   ibuprofen (MOTRIN) 800 mg tablet Take 1 Tab by mouth every eight (8) hours as needed for Pain. 2/28/17  Yes Jericho Hunt MD        Review of Systems:  A comprehensive review of systems was negative except for that written in the History of Present Illness. Objective:     Vitals:    03/11/17 0357 03/11/17 0700 03/11/17 0802 03/11/17 1143   BP: 137/75  135/79 121/70   Pulse: (!) 107 (!) 104 96 (!) 102   Resp: 16  18 20   Temp: 98.3 °F (36.8 °C)  98.4 °F (36.9 °C) 98.1 °F (36.7 °C)   SpO2: 98%  99% 99%   Weight:       Height:           Physical Exam:  Patient without distress. Heart: Regular rate and rhythm or S1S2 present  Lung: clear to auscultation throughout lung fields, no wheezes, no rales, no rhonchi and normal respiratory effort  Abdomen: soft, nontender  Vagina: normal mucosa without prolapse or lesions  Cervix: normal appearance  Adnexa: normal bimanual exam  Uterus: normal single, nontender    Assessment:     Dysfunctional Uterine bleeding    Plan:     Patient hemodynamically stable for discharge home. Bleeding profile decreasing. Patient instructed to begin at home OCPs today as directed. Discontinue estradiol 2 mg. Continue iron supplementation. Monitor bleeding profile. Schedule follow up appointment with GYN/VINCENT within 1 week. May require D&C if bleeding profile increases again. The patient understands the risks; any and all questions were answered to the patient's satisfaction.     Signed By:  Kalee Rodriguez MD     March 11, 2017

## 2017-03-11 NOTE — PROGRESS NOTES
0715 - Bedside and Verbal shift change report given to Todd Maravilla (oncoming nurse) by Neha Cedeño (offgoing nurse). Report included the following information SBAR, Kardex, Intake/Output, Accordion, Recent Results and Cardiac Rhythm SR. Pt awake in bed, assisted to bedpan. Respirations even and unlabored on RA. No acute distress noted. 1505 - D/C instructions and education materials provided to pt. Pt verbalizes understanding of given information and denies further questions and concerns at this time. Pt A&O and in no acute distress. Respirations even and unlabored on RA with some fatigue during activity. IV and telemetry removed. Patient belongings accounted for. Awaiting ride.

## 2017-03-11 NOTE — DISCHARGE INSTRUCTIONS
HOME DISCHARGE INSTRUCTIONS    Prosper Sims / [id number] : 1985    Admission date: 3/9/2017 Discharge date: 3/11/2017 12:58 PM     Please bring this form with you to show your care provider at your follow-up appointment. Primary care provider:  Salomón Rodríguez MD    Discharging provider: Stella Montoya DO  - Family Medicine Resident  Dr. Aleshia Ford MD - Family Medicine Attending      FINAL DIAGNOSES:  Anemia  . . . . . . . . . . . . . . . . . . . . . . . . . . . . . . . . . . . . . . . . . . . . . . . . . . . . . . . . . . . . . . . . . . . . . . . Frances Rebolledo FOLLOW-UP CARE RECOMMENDATIONS:    Appointments  · Follow-up with: Follow-up Information     Follow up With Details Comments [address] [place]   [address] [facility]  [phone]      [facility] Schedule an appointment as soon as possible for a visit  [address] [place] [address] [facility]  [facility]  [phone]    ·        Follow-up tests needed: Hgb    Pending test results: At the time of your discharge the following test results are still pending: None. Please make sure you review these results with your outpatient follow-up provider(s). Specific symptoms to watch for: chest pain, shortness of breath, fever, chills, nausea, vomiting, diarrhea, change in mentation, falling, weakness, bleeding. DIET/what to eat:  Regular Diet    ACTIVITY:  Activity as tolerated    Wound care: None    Equipment needed:  None    What to do if new or unexpected symptoms occur? If you experience any of the above symptoms (or should other concerns or questions arise after discharge) please call your primary care physician. Return to the emergency room if you cannot get hold of your doctor. · It is very important that you keep your follow-up appointment(s).   · Please bring discharge papers, medication list (and/or medication bottles) to your follow-up appointments for review by your outpatient provider(s). · Please check the list of medications and be sure it includes every medication (even non-prescription medications) that your provider wants you to take. · It is important that you take the medication exactly as they are prescribed. · Keep your medication in the bottles provided by the pharmacist and keep a list of the medication names, dosages, and times to be taken in your wallet. · Do not take other medications without consulting your doctor. · If you have any questions about your medications or other instructions, please talk to your nurse or care provider before you leave the hospital.     Information obtained by:     I understand that if any problems occur once I am at home I am to contact my physician. These instructions were explained to me and I had the opportunity to ask questions. I understand and acknowledge receipt of the instructions indicated above. Physician's or R.N.'s Signature                                                                  Date/Time                                                                                                                                              Patient or Representative Signature                                                          Date/Time             Abnormal Uterine Bleeding: Care Instructions  Your Care Instructions  Abnormal uterine bleeding (AUB) is irregular bleeding from the uterus that is longer or heavier than usual or does not occur at your regular time. Sometimes it is caused by changes in hormone levels. It can also be caused by growths in the uterus, such as fibroids or polyps. Sometimes a cause cannot be found. You may have heavy bleeding when you are not expecting your period.  Your doctor may suggest a pregnancy test, if you think you are pregnant. Follow-up care is a key part of your treatment and safety. Be sure to make and go to all appointments, and call your doctor if you are having problems. It's also a good idea to know your test results and keep a list of the medicines you take. How can you care for yourself at home? · Be safe with medicines. Take pain medicines exactly as directed. ¨ If the doctor gave you a prescription medicine for pain, take it as prescribed. ¨ If you are not taking a prescription pain medicine, ask your doctor if you can take an over-the-counter medicine. · You may be low in iron because of blood loss. Eat a balanced diet that is high in iron and vitamin C. Foods rich in iron include red meat, shellfish, eggs, beans, and leafy green vegetables. Talk to your doctor about whether you need to take iron pills or a multivitamin. When should you call for help? Call 911 anytime you think you may need emergency care. For example, call if:  · You passed out (lost consciousness). Call your doctor now or seek immediate medical care if:  · You have sudden, severe pain in your belly or pelvis. · You have severe vaginal bleeding. You are soaking through your usual pads or tampons every hour for 2 or more hours. · You feel dizzy or lightheaded, or you feel like you may faint. Watch closely for changes in your health, and be sure to contact your doctor if:  · You have new belly or pelvic pain. · You have a fever. · Your bleeding gets worse or lasts longer than 1 week. · You think you may be pregnant. Where can you learn more? Go to http://yumiko-maxine.info/. Enter R905 in the search box to learn more about \"Abnormal Uterine Bleeding: Care Instructions. \"  Current as of: February 25, 2016  Content Version: 11.1  © 2383-1928 [employer].  Care instructions adapted under license by [employer] (which disclaims liability or warranty for this information). If you have questions about a medical condition or this instruction, always ask your healthcare professional. [address] any warranty or liability for your use of this information. Anemia From Heavy Bleeding: Care Instructions  Your Care Instructions    Anemia means that your body does not have enough red blood cells. Red blood cells carry oxygen around the body. When you have anemia, you may feel dizzy, tired, and weak. You may also feel your heart pounding. For some people, it's hard to focus and think clearly. One common cause of anemia is bleeding. Bleeding from ulcers, hemorrhoids, cancer, or other problems can cause anemia. It may also be caused by heavy menstrual periods. Your treatment may include iron pills. Iron helps your body make hemoglobin. Hemoglobin is the part of the red blood cell that carries oxygen. If you have severe anemia, you may need a blood transfusion to give you red blood cells as quickly as possible. Sometimes it takes several months to get iron levels back to normal.  Follow-up care is a key part of your treatment and safety. Be sure to make and go to all appointments, and call your doctor if you are having problems. It's also a good idea to know your test results and keep a list of the medicines you take. How can you care for yourself at home? · Be safe with medicines. Take your medicines exactly as prescribed. Call your doctor if you think you are having a problem with your medicine. · Follow your doctor's advice about eating foods that have a lot of iron in them. These include red meat, shellfish, poultry, and eggs. They also include beans, raisins, whole-grain bread, and leafy green vegetables. · Steam your vegetables. This is the best way to prepare them if you want to get as much iron as possible. · Iron pills can cause constipation. If you take them, there are things you can do to avoid constipation.  Drink plenty of fluids, eat foods with a lot of fiber, and exercise every day. When should you call for help? Call 911 anytime you think you may need emergency care. For example, call if:  · You passed out (lost consciousness). · Your stools are maroon or very bloody. Call your doctor now or seek immediate medical care if:  · You have new or worse trouble breathing. · You are dizzy or lightheaded, or you feel like you may faint. · You have any abnormal bleeding, such as:  ¨ Nosebleeds. ¨ Vaginal bleeding that is different (heavier, more frequent, at a different time of the month) than what you are used to. ¨ Bloody or black stools, or rectal bleeding. ¨ Bloody or pink urine. Watch closely for changes in your health, and be sure to contact your doctor if:  · You feel weaker or more tired than usual.  · You do not get better as expected. Where can you learn more? Go to http://yumiko-maxine.info/. Enter P861 in the search box to learn more about \"Anemia From Heavy Bleeding: Care Instructions. \"  Current as of: June 17, 2016  Content Version: 11.1  © 4206-0873 [employer]. Care instructions adapted under license by [employer] (which disclaims liability or warranty for this information). If you have questions about a medical condition or this instruction, always ask your healthcare professional. [address] any warranty or liability for your use of this information. Anemia: Care Instructions  Your Care Instructions    Anemia is a low level of red blood cells, which carry oxygen throughout your body. Many things can cause anemia. Lack of iron is one of the most common causes. Your body needs iron to make hemoglobin, a substance in red blood cells that carries oxygen from the lungs to your body's cells. Without enough iron, the body produces fewer and smaller red blood cells. As a result, your body's cells do not get enough oxygen, and you feel tired and weak.  And you may have trouble concentrating. Bleeding is the most common cause of a lack of iron. You may have heavy menstrual bleeding or bleeding caused by conditions such as ulcers, hemorrhoids, or cancer. Regular use of aspirin or other anti-inflammatory medicines (such as ibuprofen) also can cause bleeding in some people. A lack of iron in your diet also can cause anemia, especially at times when the body needs more iron, such as during pregnancy, infancy, and the teen years. Your doctor may have prescribed iron pills. It may take several months of treatment for your iron levels to return to normal. Your doctor also may suggest that you eat foods that are rich in iron, such as meat and beans. There are many other causes of anemia. It is not always due to a lack of iron. Finding the specific cause of your anemia will help your doctor find the right treatment for you. Follow-up care is a key part of your treatment and safety. Be sure to make and go to all appointments, and call your doctor if you are having problems. It's also a good idea to know your test results and keep a list of the medicines you take. How can you care for yourself at home? · Take your medicines exactly as prescribed. Call your doctor if you think you are having a problem with your medicine. · If your doctor recommends iron pills, take them as directed:  ¨ Try to take the pills on an empty stomach about 1 hour before or 2 hours after meals. But you may need to take iron with food to avoid an upset stomach. ¨ Do not take antacids or drink milk or caffeine drinks (such as coffee, tea, or cola) at the same time or within 2 hours of the time that you take your iron. They can make it hard for your body to absorb the iron. ¨ Vitamin C (from food or supplements) helps your body absorb iron. Try taking iron pills with a glass of orange juice or some other food that is high in vitamin C, such as citrus fruits.   ¨ Iron pills may cause stomach problems, such as heartburn, nausea, diarrhea, constipation, and cramps. Be sure to drink plenty of fluids, and include fruits, vegetables, and fiber in your diet each day. Iron pills often make your bowel movements dark or green. ¨ If you forget to take an iron pill, do not take a double dose of iron the next time you take a pill. ¨ Keep iron pills out of the reach of small children. An overdose of iron can be very dangerous. · Follow your doctor's advice about eating iron-rich foods. These include red meat, shellfish, poultry, eggs, beans, raisins, whole-grain bread, and leafy green vegetables. · Steam vegetables to help them keep their iron content. When should you call for help? Call 911 anytime you think you may need emergency care. For example, call if:  · You have symptoms of a heart attack. These may include:  ¨ Chest pain or pressure, or a strange feeling in the chest.  ¨ Sweating. ¨ Shortness of breath. ¨ Nausea or vomiting. ¨ Pain, pressure, or a strange feeling in the back, neck, jaw, or upper belly or in one or both shoulders or arms. ¨ Lightheadedness or sudden weakness. ¨ A fast or irregular heartbeat. After you call 911, the  may tell you to chew 1 adult-strength or 2 to 4 low-dose aspirin. Wait for an ambulance. Do not try to drive yourself. · You passed out (lost consciousness). Call your doctor now or seek immediate medical care if:  · You have new or increased shortness of breath. · You are dizzy or lightheaded, or you feel like you may faint. · Your fatigue and weakness continue or get worse. · You have any abnormal bleeding, such as:  ¨ Nosebleeds. ¨ Vaginal bleeding that is different (heavier, more frequent, at a different time of the month) than what you are used to. ¨ Bloody or black stools, or rectal bleeding. ¨ Bloody or pink urine. Watch closely for changes in your health, and be sure to contact your doctor if:  · You do not get better as expected.   Where can you learn more?  Go to http://yumiko-maxine.info/. Enter R301 in the search box to learn more about \"Anemia: Care Instructions. \"  Current as of: February 5, 2016  Content Version: 11.1  © 2584-8272 [employer] [place]. Care instructions adapted under license by [employer] (which disclaims liability or warranty for this information). If you have questions about a medical condition or this instruction, always ask your healthcare professional. [address] any warranty or liability for your use of this information.

## 2017-03-11 NOTE — PROGRESS NOTES
[address] [place] RESIDENCY PROGRAM  PROGRESS NOTE     3/11/2017  PCP: Joanie Corbett MD     Assessment/Plan:   Roxana Hutchinson is a 36 y.o. female with history of obesity, irregular periods, pre-diabetes, HLD and anemia who is admitted for symptomatic anemia secondary to a vaginal bleed.      Acute blood loss anemia secondary to menometrorrhagia:  Hgb in the ER 5.8 (baseline ~ 12.5). S/p 4 units PRBCs. Hgb 7.1. TSH: 3.14  US pelvis incomplete due to pain. - Continue IV Premarin q4h  - f/u ob hospitalist recs  - continue iron sulfate  - SCD, ambulate with assistance  - H/H q6h      H/o irregular periods: possible dysovulation, PCOS   - f/u with OB gyn recs       H/o prediabetes: A1C (3/10) <3.5      HLP: stable not on meds, Lipid panel (3/11) Chol T: 126, HDL:34, LDL: 62.6, T      Obesity: BMI: 47.   - Advise weight loss      FEN/GI - Regular diet. NS at 185 mL/hr. Activity - Ambulate with assistance  DVT prophylaxis - SCDs  GI prophylaxis - Protonix  Disposition - Admit to Telemetry. Plan to d/c to Home. Code Status - Full, discussed with patient / caregivers. Subjective:   Pt was seen and examined at bedside. Afebrile and hemodynamically stable. Vaginal bleeding continues with clots noted. Denies chest pain, SOB, nausea, vomiting, abdominal pain, dizziness.      Allergies:  No Known Allergies    Objective:   Physical examination  Visit Vitals    /79 (BP 1 Location: Left arm, BP Patient Position: At rest)    Pulse 96    Temp 98.4 °F (36.9 °C)    Resp 18    Ht 5' 9\" (1.753 m)    Wt 320 lb (145.2 kg)    LMP 2017    SpO2 99%    Breastfeeding No    BMI 47.26 kg/m2      Temp (24hrs), Av.5 °F (36.9 °C), Min:98.2 °F (36.8 °C), Max:99 °F (37.2 °C)     O2 Flow Rate (L/min): 2 l/min   O2 Device: Room air      Intake/Output Summary (Last 24 hours) at 17 1103  Last data filed at 17 1029   Gross per 24 hour   Intake           1464.6 ml   Output 900 ml   Net            564.6 ml     Last shift:    03/11 0701 - 03/11 1900  In: 170 [P.O.:120; I.V.:50]  Out: 300 [Urine:300]  Last 3 shifts:    03/09 1901 - 03/11 0700  In: 1946.6 [P.O.:840; I.V.:629]  Out: 650 [Urine:650]    General:  Alert, cooperative, no acute distress, obese   Head:  Atraumatic   Eyes:  Conjunctivae clear   ENT: Oral mucosa normal   Neck: Supple, trachea midline, no adenopathy  No JVD   Back:  No CVA tenderness    Chest wall:  No tenderness or deformities    Lungs:  Clear to auscultation bilaterally    Heart:  Regular rhythm, no murmur   Abdomen:  Soft, non-tender  No masses or organomegaly    Extremities: No edema or DVT signs   Pulses: Symmetric all extremities   Skin: Warm and dry   No rashes or lesions   Neurologic: Oriented  No focal deficits     Data Review:     Recent Labs      03/11/17   1012  03/11/17   0359  03/10/17   2249   03/10/17   0649   03/09/17   1458   WBC   --   12.3*   --    --   16.2*   --   14.7*   HGB  7.5*  7.1*  6.2*   < >  7.8*   < >  5.8*   HCT  21.9*  21.3*  18.4*   < >  23.8*   < >  18.1*   PLT   --   282   --    --   255   --   306    < > = values in this interval not displayed.      Recent Labs      03/11/17   0359  03/10/17   0649  03/09/17   1458   NA  139  139  139   K  4.0  4.1  3.8   CL  109*  106  105   CO2  23  23  22   GLU  97  118*  145*   BUN  6  11  7   CREA  0.63  0.74  0.93   CA  7.4*  7.8*  8.3*   ALB  2.2*  2.6*   --    SGOT  19  21   --    ALT  35  36   --      Medications reviewed  Current Facility-Administered Medications   Medication Dose Route Frequency    congugated estrogens (PREMARIN) 25 mg in 0.9% sodium chloride 50 mL IVPB  25 mg IntraVENous Q4H    metoclopramide HCl (REGLAN) injection 5 mg  5 mg IntraVENous Q6H    0.9% sodium chloride infusion 250 mL  250 mL IntraVENous PRN    ferrous sulfate tablet 325 mg  325 mg Oral BID WITH MEALS    loratadine (CLARITIN) tablet 10 mg  10 mg Oral DAILY PRN    0.9% sodium chloride infusion 250 mL  250 mL IntraVENous PRN    sodium chloride (NS) flush 5-10 mL  5-10 mL IntraVENous Q8H    sodium chloride (NS) flush 5-10 mL  5-10 mL IntraVENous PRN    diphenhydrAMINE (BENADRYL) capsule 25 mg  25 mg Oral Q4H PRN    0.9% sodium chloride infusion  185 mL/hr IntraVENous CONTINUOUS     Imaging:  US Transvaginal (3/6):  ULTRASOUND PELVIC TRANSVAGINAL:   Multiple sonographic real time images were obtained with transvaginal technique  to better visualize the uterus and ovaries/adnexa. Cervical nabothian cysts are noted. The uterus measures 3.5 x 3.9 x 8.0 cm. The visualized endometrial stripe measures 0.6 cm. The ovaries are obscured by bowel gas. There is trace free fluid.         IMPRESSION  IMPRESSION:   No uterine fibroids or ovarian cystic masses detected. US Transvaginal (3/9):  FINDINGS:  Transabdominally, the right ovary measures 19 x 22 x 19 mm. The bladder is not  full, and the uterus and left ovary are not shown.      Transvaginally, the uterus measures approximately 89 x 38 mm, and is poorly  visualized on this view allowed images. The patient asked for the exam to be  stopped due to pain. She vomited during the examination. Blood was observed on  the probe during examination.      IMPRESSION  IMPRESSION: Limited examination as described above. Signed:    Alcira Peñaloza DO   Resident, Family Medicine

## 2017-03-11 NOTE — PROGRESS NOTES
Bedside and Verbal shift change report given to Makenzie Holman (oncoming nurse) by [place] (offgoing nurse). Report included the following information SBAR, Kardex, ED Summary, Procedure Summary, Intake/Output, MAR, Accordion, Recent Results, Med Rec Status and Cardiac Rhythm ST.       0715: Bedside and Verbal shift change report given to Patricia Leiva (oncoming nurse) by Makenzie Holman (offgoing nurse).  Report included the following information SBAR, Kardex, ED Summary, Procedure Summary, Intake/Output, MAR, Accordion, Recent Results, Med Rec Status and Cardiac Rhythm ST.

## 2017-03-11 NOTE — PROGRESS NOTES
1223 heart rate in the 150s family practice aware will continue to follow heart rate now 109. No new orders received.

## 2017-03-11 NOTE — DISCHARGE SUMMARY
[address] [facility]                                                                                DISCHARGE SUMMARY     Patient: Prosper Sims  MRN: [id number]  YOB: 1985  Age: 36 y.o. Date of admission:  3/9/2017  Date of discharge:  3/11/2017  Primary care provider:  Salomón Rodríguez MD   Admitting provider:  Debbie Gabriel MD    Discharging provider(s): Stella Montoya DO - Family Medicine Resident  Dr. Aleshia Ford MD -  Family Medicine Attending      Consultations:  [address] TO OB HOSPITALIST  IP CONSULT TO OB GYN    Procedures:  · None    Discharge destination: Home. The patient is stable for discharge. Admission diagnosis:  Anemia    HPI:   Ms. Baljit Etienne is a 36 y.o. female with past medical history significant for irregular periods, pre-diabetes, HLD and anemia who presents to the ED complaining of vaginal bleeding. Pt states that her vaginal bleeding started about 10 days ago. She went to see her PCP last Tuesday who started the pt on Sprintec to help control the pt's bleeding. She states was taking iron pills and Motrin also. Vaginal bleeding was worsening with large clots. She was feeling weak, dizzy, having palpitations, and came to the ED 2 days ago, where OBGYN started her on Estrogen. Pt claims that she has continued to experience vaginal bleeding with associated generalized weakness, SOB with walking and pallor. She vomited once today. She has been using 2 pads in an hour. Pt reports that she has been taking iron supplements. She denies any use of blood thinners, h/o liver disease, fever, chills, vaginal discharge ou foul odor. chest pain. Pt states that she has an irregular menstrual cycle with heavy vaginal bleeding. LMP in January 2017, irregular, lasting 3-7 days, pap smear 2 years ago was normal. Not sexually active for about a year.    US done on 3/6 shows no uterine fibroids or ovarian cystic mass detected (obstructed by bowel gas.)  Hb : 12.5 in , 8.5 on 3/7, 5.8 on 3/9    Final discharge diagnoses and brief hospital course:   Acute blood loss anemia secondary to menometrorrhagia:  Hgb in the ER 5.8 (baseline ~ 12.5). S/p 4 units PRBCs. Transvaginal US attempted but incomplete due to pain, however pt had recent transvaginal US that showed no fibroids or ovarian cystic masses. IV Premarin q4h given. Bleeding profile decreasing (2 damp chux pad in 6 hrs to 1/2 damp chux pad in 6 hours). OB/GYN hospitalist consulted. As pt had OCP at home and has decreased vaginal bleeding with stable post transfusion Hgb, 7.5; pt was discharged home to follow-up with [facility] gynecology early next week. -Patient instructed to begin at home OCPs (Sprintec (2nd week) and then follow with Apri (wk 3-4)) today as directed. -Continue iron sulfate  -Discontinue estradiol 2 mg  -Schedule follow up appointment with GYN/[facility] within next week. May require D&C if bleeding profile increases again.         H/o prediabetes: A1C (3/10) <3.5  -Follow-up with PCP.       HLP: stable not on meds, Lipid panel (3/11) Chol T: 126, HDL:34, LDL: 62.6, T      Obesity: BMI: 47.   - Advise weight loss    Follow-up Cares:   · Pending LABS at the time of Discharge: None  · Labs Need to Repeat by PCP: Hgb    Follow-up Information     Follow up With Details Comments [address] [place]   [address] [place] [id number]  [facility] Schedule an appointment as soon as possible for a visit  [address]  [address]            Physical Examination at Discharge:     Visit Vitals    /70 (BP 1 Location: Left arm, BP Patient Position: Supine)    Pulse (!) 102    Temp 98.1 °F (36.7 °C)    Resp 20    Ht 5' 9\" (1.753 m)    Wt 320 lb (145.2 kg)    SpO2 99%    Breastfeeding No    BMI 47.26 kg/m2       General:  Alert, cooperative, no acute distress, obese   Head:  Atraumatic   Eyes:  Conjunctivae clear   ENT: Oral mucosa normal   Neck: Supple, trachea midline, no adenopathy  No JVD   Back:  No CVA tenderness    Chest wall:  No tenderness or deformities    Lungs:  Clear to auscultation bilaterally    Heart:  Regular rhythm, no murmur   Abdomen:  Soft, non-tender  No masses or organomegaly    Extremities: No edema or DVT signs   Pulses: Symmetric all extremities   Skin: Warm and dry   No rashes or lesions   Neurologic: Oriented  No focal deficits        Pertinent Imaging Studies:     Us Transvaginal    Result Date: 3/9/2017  LIMITED TRANSABDOMINAL AND TRANSVAGINAL PELVIC ULTRASOUND WITH PELVIC DOPPLER. 3/9/2017 10:12 PM INDICATION: Vaginal bleeding. COMPARISON: None. TECHNIQUE: Limited grayscale, color, and Doppler ultrasound imaging of the pelvis was performed both transabdominally and transvaginally. FINDINGS: Transabdominally, the right ovary measures 19 x 22 x 19 mm. The bladder is not full, and the uterus and left ovary are not shown. Transvaginally, the uterus measures approximately 89 x 38 mm, and is poorly visualized on this view allowed images. The patient asked for the exam to be stopped due to pain. She vomited during the examination. Blood was observed on the probe during examination. IMPRESSION: Limited examination as described above. Us Transvaginal    Result Date: 3/6/2017  Examination: US PELV NON OBS - [id number] REPORT: INDICATION:  Menorrhagia with irregular cycles. COMPARISON:  No old study. ULTRASOUND PELVIC TRANSABDOMINAL:   Multiple sonographic real time images were obtained with transabdominal technique. The uterus measures 3.8 x 4.2 x 9.3 cm. No uterine fibroids are appreciated The endometrial stripe measures 0.4 cm. The right ovary measures 2.1 x 2.5 x 3.1 cm. The left ovary measures 1.2 x 2.2 x 2.9 cm. No cystic masses are appreciated.   ULTRASOUND PELVIC TRANSVAGINAL:  Multiple sonographic real time images were obtained with transvaginal technique to better visualize the uterus and ovaries/adnexa. Cervical nabothian cysts are noted. The uterus measures 3.5 x 3.9 x 8.0 cm. The visualized endometrial stripe measures 0.6 cm. The ovaries are obscured by bowel gas. There is trace free fluid. IMPRESSION:   No uterine fibroids or ovarian cystic masses detected. Us Pelv Non Obs    Result Date: 3/6/2017  INDICATION:  Menorrhagia with irregular cycles. COMPARISON:  No old study. ULTRASOUND PELVIC TRANSABDOMINAL:  Multiple sonographic real time images were obtained with transabdominal technique. The uterus measures 3.8 x 4.2 x 9.3 cm. No uterine fibroids are appreciated The endometrial stripe measures 0.4 cm. The right ovary measures 2.1 x 2.5 x 3.1 cm. The left ovary measures 1.2 x 2.2 x 2.9 cm. No cystic masses are appreciated. ULTRASOUND PELVIC TRANSVAGINAL: Multiple sonographic real time images were obtained with transvaginal technique to better visualize the uterus and ovaries/adnexa. Cervical nabothian cysts are noted. The uterus measures 3.5 x 3.9 x 8.0 cm. The visualized endometrial stripe measures 0.6 cm. The ovaries are obscured by bowel gas. There is trace free fluid. IMPRESSION:  No uterine fibroids or ovarian cystic masses detected. Us Pelv Non Obs  Ltd    Result Date: 3/9/2017  LIMITED TRANSABDOMINAL AND TRANSVAGINAL PELVIC ULTRASOUND WITH PELVIC DOPPLER. 3/9/2017 10:12 PM INDICATION: Vaginal bleeding. COMPARISON: None. TECHNIQUE: Limited grayscale, color, and Doppler ultrasound imaging of the pelvis was performed both transabdominally and transvaginally. FINDINGS: Transabdominally, the right ovary measures 19 x 22 x 19 mm. The bladder is not full, and the uterus and left ovary are not shown. Transvaginally, the uterus measures approximately 89 x 38 mm, and is poorly visualized on this view allowed images.  The patient asked for the exam to be stopped due to pain. She vomited during the examination. Blood was observed on the probe during examination. IMPRESSION: Limited examination as described above.    ---------------------------------    Discharge Medications:      Current Discharge Medication List      CONTINUE these medications which have NOT CHANGED    Details   loratadine (CLARITIN) 10 mg tablet Take 10 mg by mouth daily as needed for Allergies. desogestrel-ethinyl estradiol (APRI) 0.15-0.03 mg tab Take 1 Tab by mouth daily. ferrous sulfate (IRON) 325 mg (65 mg iron) EC tablet Take 1 Tab by mouth two (2) times daily (with meals). Qty: 60 Tab, Refills: 0    Associated Diagnoses: Menorrhagia with irregular cycle      ibuprofen (MOTRIN) 800 mg tablet Take 1 Tab by mouth every eight (8) hours as needed for Pain. Qty: 30 Tab, Refills: 0    Associated Diagnoses: Menorrhagia with irregular cycle         STOP taking these medications       estradiol (ESTRACE) 2 mg tablet Comments:   Reason for Stopping:               Chronic Diagnoses:    Problem List as of 3/11/2017  Date Reviewed: 7/15/2016          Codes Class Noted - Resolved    Anemia ICD-10-CM: D64.9  ICD-9-CM: 285.9  3/9/2017 - Present        Microscopic hematuria ICD-10-CM: R31.29  ICD-9-CM: 599.72  7/15/2016 - Present        Obesity, Class III, BMI 40-49.9 (morbid obesity) ([facility] ICD-10-CM: E66.01  ICD-9-CM: 278.01  1/15/2015 - Present        Pre-diabetes ICD-10-CM: R73.03  ICD-9-CM: 790.29  Unknown - Present              Signed:       Macario Panchal DO   Family Medicine Resident      3/11/2017   12:54 PM     Cc: Yung Frias MD

## 2017-03-13 LAB
ABO + RH BLD: NORMAL
BLD PROD TYP BPU: NORMAL
BLOOD GROUP ANTIBODIES SERPL: NORMAL
BPU ID: NORMAL
CROSSMATCH RESULT,%XM: NORMAL
SPECIMEN EXP DATE BLD: NORMAL
STATUS OF UNIT,%ST: NORMAL
UNIT DIVISION, %UDIV: 0

## 2017-03-15 ENCOUNTER — OFFICE VISIT (OUTPATIENT)
Dept: FAMILY MEDICINE CLINIC | Age: 32
End: 2017-03-15

## 2017-03-15 VITALS
OXYGEN SATURATION: 100 % | BODY MASS INDEX: 43.4 KG/M2 | SYSTOLIC BLOOD PRESSURE: 125 MMHG | RESPIRATION RATE: 16 BRPM | HEART RATE: 125 BPM | DIASTOLIC BLOOD PRESSURE: 82 MMHG | HEIGHT: 69 IN | WEIGHT: 293 LBS

## 2017-03-15 DIAGNOSIS — D64.9 ANEMIA, UNSPECIFIED TYPE: Primary | ICD-10-CM

## 2017-03-15 DIAGNOSIS — N92.1 MENORRHAGIA WITH IRREGULAR CYCLE: ICD-10-CM

## 2017-03-15 LAB — HGB BLD-MCNC: 6.8 G/DL

## 2017-03-15 RX ORDER — LANOLIN ALCOHOL/MO/W.PET/CERES
CREAM (GRAM) TOPICAL
Refills: 0 | COMMUNITY
Start: 2017-02-28 | End: 2017-03-15 | Stop reason: SDUPTHER

## 2017-03-15 RX ORDER — NORGESTIMATE AND ETHINYL ESTRADIOL 0.25-0.035
KIT ORAL
Refills: 10 | COMMUNITY
Start: 2017-02-28 | End: 2017-04-04 | Stop reason: ALTCHOICE

## 2017-03-15 RX ORDER — ESTRADIOL 2 MG/1
TABLET ORAL
Refills: 0 | COMMUNITY
Start: 2017-03-08 | End: 2017-03-17

## 2017-03-15 NOTE — PROGRESS NOTES
Rodriguez Egan is a 36 y.o. female who presents for hospital follow up. Hospitalized at [facility] from 3/9 -3/11/17 for acute blood loss anemia secondary to menorrhagia. Hgb was as low as 5.8 and she received 4 units of PRBC's. She had a normal pelvic ultrasound. Given IV Premarin. Discharged on OCP's and iron supplement. PMHx:  Past Medical History:   Diagnosis Date    Heart murmur of      HLD (hyperlipidemia)     Pre-diabetes        Meds:   Current Outpatient Prescriptions   Medication Sig Dispense Refill    SPRINTEC, 28, 0.25-35 mg-mcg tab TAKE 1 TABLET BY MOUTH EVERY DAY  10    loratadine (CLARITIN) 10 mg tablet Take 10 mg by mouth daily as needed for Allergies.  ferrous sulfate (IRON) 325 mg (65 mg iron) EC tablet Take 1 Tab by mouth two (2) times daily (with meals). 60 Tab 0    ibuprofen (MOTRIN) 800 mg tablet Take 1 Tab by mouth every eight (8) hours as needed for Pain. 30 Tab 0    desogestrel-ethinyl estradiol (APRI) 0.15-0.03 mg tab Take 1 Tab by mouth daily.          Allergies:   No Known Allergies    Smoker:  History   Smoking Status    Former Smoker    Packs/day: 0.25    Years: 10.00    Types: Cigarettes    Quit date: 2014   Smokeless Tobacco    Never Used       ETOH:   History   Alcohol Use    3.0 oz/week    5 Standard drinks or equivalent per week     Comment: social       FH:   Family History   Problem Relation Age of Onset    Heart Disease Mother     Hypertension Mother     Psychiatric Disorder Mother     Diabetes Father     Heart Disease Father     Psychiatric Disorder Sister     Cancer Maternal Grandmother      Lung cancer    Cancer Maternal Grandfather     Diabetes Other      Paternal great-grandmother   Angelo Lan Other      family members with kidney stones       ROS:  General/Constitutional:   No headache, fever, fatigue, weight loss or weight gain       Eyes:   No redness, pruritis, pain, visual changes, swelling, or discharge Ears:    No pain, loss or changes in hearing     Neck:   No swelling, masses, stiffness, pain, or limited movement     Cardiac:    No chest pain      Respiratory:   No cough or shortness of breath     GI:   No nausea/vomiting, diarrhea, abdominal pain, bloody or dark stools       :   No dysuria or  hematuria    Neurological:   No loss of consciousness, dizziness, seizures, dysarthria, cognitive changes, memory changes,  problems with balance, or unilateral weakness     Skin: No rash     Physical Exam:  Visit Vitals    LMP 02/28/2017     GEN: No apparent distress. Alert and oriented and responds to all questions appropriately. EYES:  Conjunctiva clear; pupils round and reactive to light; extraocular movements are intact. EAR: External ears are normal.  Tympanic membranes are clear and without effusion. NOSE: Turbinates are within normal limits. No drainage  OROPHYARYNX: No oral lesions or exudates. NECK:  Supple; no masses; thyroid normal           LUNGS: Respirations unlabored; clear to auscultation bilaterally  CARDIOVASCULAR: Regular, rate, and rhythm without murmurs, gallops or rubs   ABDOMEN: Soft; nontender; nondistended; normoactive bowel sounds; no masses or organomegaly  NEUROLOGIC:  No focal neurologic deficits. Strength and sensation grossly intact. Coordination and gait grossly intact. EXT: Well perfused. No edema. SKIN: No obvious rashes. Assessment:    ICD-10-CM ICD-9-CM    1. Anemia, unspecified type D64.9 285.9    2. Menorrhagia with irregular cycle N92.1 626.2        Plan: Continue OCP's and iron supplement. Has follow up scheduled with GYN. RTC as needed and for routine follow up visits.

## 2017-03-15 NOTE — PROGRESS NOTES
Geraldine Posey is a 36 y.o. female who presents for hospital follow up. Hospitalized at [facility] from 3/9 -3/11/17 for acute blood loss anemia secondary to menorrhagia. Hgb was as low as 5.8 and she received 4 units of PRBC's. She had a normal pelvic ultrasound. Given IV Premarin. Discharged on OCP's and iron supplement. Hemoglobin at discharge was 7.5    Today she is tachycardic and fatigued. She feels better than when she was in the hospital but still very sluggish. Reports dyspnea and palpitations with exertion. No chest pain. She has not been able to tolerate PO iron today due to headache. Taking OCP's. Had light episode of spotting this morning and passed two clots yesterday. Otherwise bleeding has slowed. POC Hemoglobin in office today 6.8    PMHx:  Past Medical History:   Diagnosis Date    Anemia     Heart murmur of      HLD (hyperlipidemia)     Pre-diabetes        Meds:   Current Outpatient Prescriptions   Medication Sig Dispense Refill    SPRINTEC, 28, 0.25-35 mg-mcg tab TAKE 1 TABLET BY MOUTH EVERY DAY  10    loratadine (CLARITIN) 10 mg tablet Take 10 mg by mouth daily as needed for Allergies.  ferrous sulfate (IRON) 325 mg (65 mg iron) EC tablet Take 1 Tab by mouth two (2) times daily (with meals). 60 Tab 0    ibuprofen (MOTRIN) 800 mg tablet Take 1 Tab by mouth every eight (8) hours as needed for Pain. 30 Tab 0    estradiol (ESTRACE) 2 mg tablet TAKE 1 TABLET BY MOUTH EVERY DAY FOR 10 DAYS  0    desogestrel-ethinyl estradiol (APRI) 0.15-0.03 mg tab Take 1 Tab by mouth daily.          Allergies:   No Known Allergies    Smoker:  History   Smoking Status    Former Smoker    Packs/day: 0.25    Years: 10.00    Types: Cigarettes    Quit date: 2014   Smokeless Tobacco    Never Used       ETOH:   History   Alcohol Use    3.0 oz/week    5 Standard drinks or equivalent per week     Comment: social       FH:   Family History   Problem Relation Age of Onset  Heart Disease Mother     Hypertension Mother     Psychiatric Disorder Mother     Diabetes Father     Heart Disease Father     Psychiatric Disorder Sister     Cancer Maternal Grandmother      Lung cancer    Cancer Maternal Grandfather     Diabetes Other      Paternal great-grandmother   Sohail Camejo Other      family members with kidney stones       ROS:  General/Constitutional:   No  fever, chills, weight loss or weight gain       Eyes:   No visual changes, swelling, or discharge      Neck:   No swelling, masses   Cardiac:    No chest pain       Respiratory:   No cough   GI:   No nausea/vomiting, diarrhea, abdominal pain, bloody or dark stools       :   No dysuria or  hematuria    Neurological:   No loss of consciousness, dizziness    Physical Exam:  Visit Vitals    /82    Pulse (!) 115, repeat 125    Resp 16    Ht 5' 9\" (1.753 m)    Wt 320 lb (145.2 kg)    LMP 02/28/2017    SpO2 100%    BMI 47.26 kg/m2     GEN: Pale and fatigued. No apparent distress. Alert and oriented and responds to all questions appropriately. EYES:  Conjunctiva clear; pupils round and reactive to light; extraocular movements are intact. EAR: External ears are normal.  Tympanic membranes are clear and without effusion. NOSE: Turbinates are within normal limits. No drainage  OROPHYARYNX: No oral lesions or exudates. NECK:  Supple; no masses; thyroid normal           LUNGS: Respirations unlabored; clear to auscultation bilaterally  CARDIOVASCULAR: tachycardic, regular rhythm without murmurs, gallops or rubs   ABDOMEN: Soft; nontender; nondistended  NEUROLOGIC:  No focal neurologic deficits. Assessment:    ICD-10-CM ICD-9-CM    1. Anemia, unspecified type D64.9 285.9 AMB POC HEMOGLOBIN (HGB)   2. Menorrhagia with irregular cycle N92.1 626.2      35 yo with acute blood loss anemia due to menorrhagia. POC Hemoglobin is 6.8 in office and she is tachycardic with HR in the 110-120's and symptomatic.     Plan: Referred to ER for labs and transfusion. She is not actively bleeding at this time she says her vaginal bleeding is resolving. She may be able to be stabilized and make it to her scheduled GYN appointment that is scheduled for tomorrow. If she starts to bleed again will need inpatient GYN consult. Pt is with mom who will drive her to [address] [place] [address] ED.      Darwin Sanchez, PGY-3  Family Medicine Resident

## 2017-03-15 NOTE — PROGRESS NOTES
I reviewed with the resident the medical history and the resident's findings on the physical examination. I discussed with the resident the patient's diagnosis and concur with the plan. Agree with ED evaluation for possible transfusion. Repeat CBC in ED.

## 2017-03-15 NOTE — PATIENT INSTRUCTIONS
Anemia From Heavy Bleeding: Care Instructions  Your Care Instructions    Anemia means that your body does not have enough red blood cells. Red blood cells carry oxygen around the body. When you have anemia, you may feel dizzy, tired, and weak. You may also feel your heart pounding. For some people, it's hard to focus and think clearly. One common cause of anemia is bleeding. Bleeding from ulcers, hemorrhoids, cancer, or other problems can cause anemia. It may also be caused by heavy menstrual periods. Your treatment may include iron pills. Iron helps your body make hemoglobin. Hemoglobin is the part of the red blood cell that carries oxygen. If you have severe anemia, you may need a blood transfusion to give you red blood cells as quickly as possible. Sometimes it takes several months to get iron levels back to normal.  Follow-up care is a key part of your treatment and safety. Be sure to make and go to all appointments, and call your doctor if you are having problems. It's also a good idea to know your test results and keep a list of the medicines you take. How can you care for yourself at home? · Be safe with medicines. Take your medicines exactly as prescribed. Call your doctor if you think you are having a problem with your medicine. · Follow your doctor's advice about eating foods that have a lot of iron in them. These include red meat, shellfish, poultry, and eggs. They also include beans, raisins, whole-grain bread, and leafy green vegetables. · Steam your vegetables. This is the best way to prepare them if you want to get as much iron as possible. · Iron pills can cause constipation. If you take them, there are things you can do to avoid constipation. Drink plenty of fluids, eat foods with a lot of fiber, and exercise every day. When should you call for help? Call 911 anytime you think you may need emergency care. For example, call if:  · You passed out (lost consciousness).   · Your stools are maroon or very bloody. Call your doctor now or seek immediate medical care if:  · You have new or worse trouble breathing. · You are dizzy or lightheaded, or you feel like you may faint. · You have any abnormal bleeding, such as:  ¨ Nosebleeds. ¨ Vaginal bleeding that is different (heavier, more frequent, at a different time of the month) than what you are used to. ¨ Bloody or black stools, or rectal bleeding. ¨ Bloody or pink urine. Watch closely for changes in your health, and be sure to contact your doctor if:  · You feel weaker or more tired than usual.  · You do not get better as expected. Where can you learn more? Go to http://yumiko-maxine.info/. Enter T139 in the search box to learn more about \"Anemia From Heavy Bleeding: Care Instructions. \"  Current as of: June 17, 2016  Content Version: 11.1  © 1968-0506 [employer]. Care instructions adapted under license by [employer] (which disclaims liability or warranty for this information). If you have questions about a medical condition or this instruction, always ask your healthcare professional. [place] [address] any warranty or liability for your use of this information.

## 2017-03-15 NOTE — PROGRESS NOTES
Chief Complaint   Patient presents with   [facility] Follow Up     anemia follow up

## 2017-03-15 NOTE — PROGRESS NOTES
Chief Complaint   Patient presents with   [facility] Follow Up     anemia follow up     1. Have you been to the ER, urgent care clinic since your last visit? Hospitalized since your last visit? Yes, [facility]    2. Have you seen or consulted any other health care providers outside of the [address] [place] [address] since your last visit? Include any pap smears or colon screening.   No

## 2017-03-15 NOTE — MR AVS SNAPSHOT
Visit Information Date & Time Provider Department Dept. Phone Encounter #  
 3/15/2017  8:30 AM Sigrid Naqvi MD [facility] [phone] [id number] Follow-up Instructions Return if symptoms worsen or fail to improve. Follow-up and Disposition History Upcoming Health Maintenance Date Due  
 PAP AKA CERVICAL CYTOLOGY 5/5/2018 DTaP/Tdap/Td series (2 - Td) 7/10/2024 Allergies as of 3/15/2017  Review Complete On: 3/15/2017 By: Susana Bauer LPN No Known Allergies Current Immunizations  Reviewed on 11/27/2016 Name Date Influenza Vaccine 11/1/2016 MMR 8/14/2014, 7/14/2014 TB Skin Test (PPD) 7/14/2014 TB Skin Test (PPD) Intradermal 8/4/2015, 7/14/2015 Tdap 7/10/2014 Not reviewed this visit You Were Diagnosed With   
  
 Codes Comments Anemia, unspecified type    -  Primary ICD-10-CM: D64.9 ICD-9-CM: 285.9 Menorrhagia with irregular cycle     ICD-10-CM: N92.1 ICD-9-CM: 626.2 Vitals BP Pulse Resp Height(growth percentile) Weight(growth percentile) LMP  
 125/82 (!) 125 16 5' 9\" (1.753 m) 320 lb (145.2 kg) 02/28/2017 SpO2 BMI OB Status Smoking Status 100% 47.26 kg/m2 Having regular periods Former Smoker Vitals History BMI and BSA Data Body Mass Index Body Surface Area  
 47.26 kg/m 2 2.66 m 2 Preferred Pharmacy Pharmacy Name Phone [facility] [place], [address] [place] [facility] [phone] Your Updated Medication List  
  
   
This list is accurate as of: 3/15/17  6:01 PM.  Always use your most recent med list.  
  
  
  
  
 APRI 0.15-0.03 mg Tab Generic drug:  desogestrel-ethinyl estradiol Take 1 Tab by mouth daily. CLARITIN 10 mg tablet Generic drug:  loratadine Take 10 mg by mouth daily as needed for Allergies. estradiol 2 mg tablet Commonly known as:  ESTRACE  
 TAKE 1 TABLET BY MOUTH EVERY DAY FOR 10 DAYS  
  
 ferrous sulfate 325 mg (65 mg iron) EC tablet Commonly known as:  IRON Take 1 Tab by mouth two (2) times daily (with meals). ibuprofen 800 mg tablet Commonly known as:  MOTRIN Take 1 Tab by mouth every eight (8) hours as needed for Pain. [facility] (28) 0.25-35 mg-mcg Tab Generic drug:  norgestimate-ethinyl estradiol TAKE 1 TABLET BY MOUTH EVERY DAY We Performed the Following AMB POC HEMOGLOBIN (HGB) [48233 CPT(R)] Follow-up Instructions Return if symptoms worsen or fail to improve. Patient Instructions Anemia From Heavy Bleeding: Care Instructions Your Care Instructions Anemia means that your body does not have enough red blood cells. Red blood cells carry oxygen around the body. When you have anemia, you may feel dizzy, tired, and weak. You may also feel your heart pounding. For some people, it's hard to focus and think clearly. One common cause of anemia is bleeding. Bleeding from ulcers, hemorrhoids, cancer, or other problems can cause anemia. It may also be caused by heavy menstrual periods. Your treatment may include iron pills. Iron helps your body make hemoglobin. Hemoglobin is the part of the red blood cell that carries oxygen. If you have severe anemia, you may need a blood transfusion to give you red blood cells as quickly as possible. Sometimes it takes several months to get iron levels back to normal. 
Follow-up care is a key part of your treatment and safety. Be sure to make and go to all appointments, and call your doctor if you are having problems. It's also a good idea to know your test results and keep a list of the medicines you take. How can you care for yourself at home? · Be safe with medicines. Take your medicines exactly as prescribed. Call your doctor if you think you are having a problem with your medicine. · Follow your doctor's advice about eating foods that have a lot of iron in them. These include red meat, shellfish, poultry, and eggs. They also include beans, raisins, whole-grain bread, and leafy green vegetables. · Steam your vegetables. This is the best way to prepare them if you want to get as much iron as possible. · Iron pills can cause constipation. If you take them, there are things you can do to avoid constipation. Drink plenty of fluids, eat foods with a lot of fiber, and exercise every day. When should you call for help? Call 911 anytime you think you may need emergency care. For example, call if: 
· You passed out (lost consciousness). · Your stools are maroon or very bloody. Call your doctor now or seek immediate medical care if: 
· You have new or worse trouble breathing. · You are dizzy or lightheaded, or you feel like you may faint. · You have any abnormal bleeding, such as: 
¨ Nosebleeds. ¨ Vaginal bleeding that is different (heavier, more frequent, at a different time of the month) than what you are used to. ¨ Bloody or black stools, or rectal bleeding. ¨ Bloody or pink urine. Watch closely for changes in your health, and be sure to contact your doctor if: 
· You feel weaker or more tired than usual. 
· You do not get better as expected. Where can you learn more? Go to http://yumiko-maxine.info/. Enter K821 in the search box to learn more about \"Anemia From Heavy Bleeding: Care Instructions. \" Current as of: June 17, 2016 Content Version: 11.1 © 6062-4140 Healthwise, Incorporated. Care instructions adapted under license by [employer] (which disclaims liability or warranty for this information). If you have questions about a medical condition or this instruction, always ask your healthcare professional. [address] any warranty or liability for your use of this information. Introducing [facility] HEALTH SERVICES! Dear Bridget Valladares: Thank you for requesting a [employer] account. Our records indicate that you already have an active For[employer] Systems account. You can access your account anytime at [email or web address] Did you know that you can access your hospital and ER discharge instructions at any time in For[employer] Systems? You can also review all of your test results from your hospital stay or ER visit. Additional Information If you have questions, please visit the Frequently Asked Questions section of the For[employer] Systems website at [email or web address] Remember, [employer] Systems is NOT to be used for urgent needs. For medical emergencies, dial 911. Now available from your iPhone and Android! Please provide this summary of care documentation to your next provider. Your primary care clinician is listed as Concepción Scott. If you have any questions after today's visit, please call [phone].

## 2017-03-15 NOTE — LETTER
NOTIFICATION RETURN TO SCHOOL 
 
3/15/2017 8:53 AM 
 
Ms. Mustapha [facility] [address] To Whom It May Concern: 
 
Jaky Brock is currently under the care of [facility]. Initially seen in the clinic on 2/28/17 for medical problems for which she was then hospitalized from 3/9 - 3/11/2017. She has been cleared to return to school without restrictions on: 3/20/2017 If there are questions or concerns please have the patient contact our office.  
 
 
 
Sincerely, 
 
 
Wyatt Correia MD

## 2017-03-16 LAB — 17OHP SERPL-MCNC: <10 NG/DL

## 2017-03-17 ENCOUNTER — OFFICE VISIT (OUTPATIENT)
Dept: OBGYN CLINIC | Age: 32
End: 2017-03-17

## 2017-03-17 VITALS
WEIGHT: 293 LBS | HEIGHT: 69 IN | SYSTOLIC BLOOD PRESSURE: 124 MMHG | BODY MASS INDEX: 43.4 KG/M2 | RESPIRATION RATE: 16 BRPM | DIASTOLIC BLOOD PRESSURE: 72 MMHG

## 2017-03-17 DIAGNOSIS — D64.9 ANEMIA, UNSPECIFIED: Primary | ICD-10-CM

## 2017-03-17 DIAGNOSIS — N93.9 ABNORMAL UTERINE BLEEDING: Primary | ICD-10-CM

## 2017-03-17 NOTE — PROGRESS NOTES
Abnormal Uterine Bleeding      Lear Alpers is a 36 y.o. G0 with BMI of 47 presenting for evaluation of AUB. Pt with long h/o irregular menses, approx every 4 months. Most recent cycle began 17, with heavy flow beginning . Bleeding continued until pt was admitted to [address] [place] [address] from 3/9-3/11 due to acute heavy menstrual bleeding and associated anemia (with Hgb jahaira of 5.8). At that time she was having flooding and passing clots. She was given IV premarin, she received transfusion of 4U PRBCs. And she was started on OCPs, iron TID, and NSAIDS on discharge. Prior to discharge home, Hgb had risen to 7.5. Pt was then seen in office by Family Medicine on 3/15, at which point Hgb was 6.8, she was advised to go to ER for transfusion, but she then spoke with [facility] Dr. cMcall Client who advised her to continue iron, OCPs and follow up with Gyn. Today, pt reports bleeding significantly slowed, with just light bleeding/spotting at this time. No further passage of clots. Pt reports she is still somewhat fatigued but reports she is overall feeling better. Less tachycardia at this time. Denies CP or dyspnea, with only mild dyspnea on exertion. Pt is interested in Mirena IUD for long term control of bleeding. TSH wnl on 3/9/17. Alleviating factors: none    Aggravating factors: none      Ob/Gyn Hx:  G P A - G0  LMP- 19 - present  Menses- as per HPI  Contraception- sprintec  STI- denies  ? SA- not in >6 months    Health maintenance:  Pap-2015 - NILM per CC  Gardasil- never had, out of age range       Past Medical History:   Diagnosis Date    Anemia     Heart murmur of      HLD (hyperlipidemia)     Pre-diabetes         Past Surgical History:   Procedure Laterality Date    HX WISDOM TEETH EXTRACTION         Family History   Problem Relation Age of Onset    Heart Disease Mother     Hypertension Mother     Psychiatric Disorder Mother     Other Mother      ovarian cyst    Diabetes Father    [facility] Heart Disease Father     Psychiatric Disorder Sister     Cancer Maternal Grandmother      Lung cancer    Cancer Maternal Grandfather     Diabetes Other      Paternal great-grandmother   Ioana Luna Other      family members with kidney stones       Social History     Social History    Marital status: SINGLE     Spouse name: N/A    Number of children: 0    Years of education: 15     Occupational History    [facility] [employer]    Student      Goes to [employer] for health services      Social History Main Topics    Smoking status: Former Smoker     Packs/day: 0.25     Years: 10.00     Types: Cigarettes     Quit date: 7/30/2014    Smokeless tobacco: Never Used    Alcohol use 3.0 oz/week     5 Standard drinks or equivalent per week      Comment: social    Drug use: No    Sexual activity: Not Currently     Partners: Male     Birth control/ protection: Pill     Other Topics Concern    Not on file     Social History Narrative       Prior to Admission medications    Medication Sig Start Date End Date Taking? Authorizing Provider   [address] [place] [address], , 0.25-35 mg-mcg tab TAKE 1 TABLET BY MOUTH EVERY DAY 2/28/17  Yes Historical Provider   loratadine (CLARITIN) 10 mg tablet Take 10 mg by mouth daily as needed for Allergies. Yes Historical Provider   ferrous sulfate (IRON) 325 mg (65 mg iron) EC tablet Take 1 Tab by mouth two (2) times daily (with meals). 2/28/17  Yes Tete Torres MD   ibuprofen (MOTRIN) 800 mg tablet Take 1 Tab by mouth every eight (8) hours as needed for Pain. 2/28/17  Yes Tete Torres MD   estradiol (ESTRACE) 2 mg tablet TAKE 1 TABLET BY MOUTH EVERY DAY FOR 10 DAYS 3/8/17   Historical Provider   desogestrel-ethinyl estradiol (APRI) 0.15-0.03 mg tab Take 1 Tab by mouth daily.     Historical Provider        No Known Allergies    Review of Systems - History obtained from the patient  Constitutional: negative for weight loss, fever, night sweats  HEENT: negative for hearing loss, earache, congestion, snoring, sorethroat  CV: negative for chest pain, palpitations, edema  Resp: negative for cough, shortness of breath, wheezing  Breast: negative for breast lumps, nipple discharge, galactorrhea  GI: negative for change in bowel habits, abdominal pain, black or bloody stools  : negative for frequency, dysuria, hematuria, +vaginal bleeding, as per HPI  MSK: negative for back pain, joint pain, muscle pain  Skin: negative for itching, rash, hives  Neuro: negative for dizziness, headache, confusion, weakness  Psych: negative for anxiety, depression, change in mood  Heme/lymph: negative for bleeding, bruising, pallor      Objective:    Visit Vitals    /72 (BP 1 Location: Left arm, BP Patient Position: Sitting)    Resp 16    Ht 5' 9\" (1.753 m)    Wt 320 lb 9.6 oz (145.4 kg)    LMP 02/26/2017 (Exact Date)    BMI 47.34 kg/m2       PHYSICAL EXAMINATION    Constitutional  · Appearance: well-nourished, well developed, alert, in no acute distress    HENT  · Head and Face: appears normal, pale conjunctiva    Gastrointestinal  · Abdominal Examination: abdomen non-tender to palpation, normal bowel sounds, no masses present  · Liver and spleen: no hepatomegaly present, spleen not palpable  · Hernias: no hernias identified    Genitourinary  · External Genitalia: normal appearance for age, no discharge present, no tenderness present, no inflammatory lesions present, no masses present, no atrophy present  · Vagina: normal vaginal vault without central or paravaginal defects, no discharge present, no inflammatory lesions present, no masses present, <1 scopettes of blood in vault  · Bladder: non-tender to palpation  · Urethra: appears normal  · Cervix: normal, no CMT, no cervicitis   · Uterus: normal size, shape and consistency, mobile, non-tender  · Adnexa: no adnexal tenderness present, no appreciable adnexal masses present, bimanual exam significantly limited by habitus  · Perineum: perineum within normal limits, no evidence of trauma, no rashes or skin lesions present    Skin  · General Inspection: no rash, no lesions identified    Neurologic/Psychiatric  · Mental Status:  · Orientation: grossly oriented to person, place and time  · Mood and Affect: mood normal, affect appropriate    No results found for this or any previous visit (from the past 12 hour(s)). Assessment/Plan:   36 y.o. G0 presenting for evaluation of AUB - suspect anovulatory cycles due to morbid obesity.      -menstrual calendar, pad/tampon counts  -repeat CBC today  -refer for TVUS/SIS/EMBx/Mirena insertion  -NSAIDs scheduled with menses  -cont iron TID  -plan to continue OCPs as bridge to Mirena IUD at next visit (discussed R/B/A)  -reviewed bleeding precautions and reasons to come into ER (bleeding >1-2 pads/tampons per hour, associated dizziness, SOB, CP, fever, etc)    RTC: in 1-2 weeks, or sooner for any problems or concerns  -instructions and handouts given to patient, pt verbalized understanding of tx plan    Jovita Simon MD  3/17/2017  2:56 PM

## 2017-03-17 NOTE — PATIENT INSTRUCTIONS
Vaginal Bleeding in Nonpregnant Women: Care Instructions  Your Care Instructions    Many women have bleeding or spotting between periods. Lots of things can cause it. You may bleed because of hormone problems, stress, or ovulation. Fibroids and IUDs (intrauterine devices) can also cause bleeding. If your bleeding or spotting is caused by one of these things and is not heavy or doesn't happen often, you probably don't need to worry. But in rare cases, infection, cancer, or other serious conditions can cause bleeding. So you may need more tests to find the cause of your bleeding. The doctor has checked you carefully, but problems can develop later. If you notice any problems or new symptoms, get medical treatment right away. Follow-up care is a key part of your treatment and safety. Be sure to make and go to all appointments, and call your doctor if you are having problems. It's also a good idea to know your test results and keep a list of the medicines you take. How can you care for yourself at home? · Take pain medicines exactly as directed. ¨ If the doctor gave you a prescription medicine for pain, take it as prescribed. ¨ If you are not taking a prescription pain medicine, ask your doctor if you can take an over-the-counter medicine. Do not take aspirin, which may make bleeding worse. · If your doctor prescribed birth control pills for your bleeding, take them as directed. · Eat foods that are high in iron and vitamin C. Foods high in iron include red meat, shellfish, eggs, beans, and leafy green vegetables. Foods high in vitamin C include citrus fruits, tomatoes, and broccoli. Ask your doctor if you need to take iron pills or a multivitamin. · Ask your doctor when it is okay to have sex. When should you call for help? Call 911 anytime you think you may need emergency care. For example, call if:  · You passed out (lost consciousness). · You have sudden, severe pain in your belly or pelvis.   Call your doctor now or seek immediate medical care if:  · You have severe vaginal bleeding. You are soaking through your usual pads or tampons each hour for 2 or more hours. · You are dizzy or lightheaded or you feel like you may faint. · You have new belly or pelvic pain. · You have a fever. · Your bleeding gets worse. Watch closely for changes in your health, and be sure to contact your doctor if:  · You have new or worse vaginal discharge. · You do not get better as expected. Where can you learn more? Go to http://yumiko-maxine.info/. Enter R614 in the search box to learn more about \"Vaginal Bleeding in Nonpregnant Women: Care Instructions. \"  Current as of: February 25, 2016  Content Version: 11.1  © 7407-9767 [employer] Inc., Incorporated. Care instructions adapted under license by [employer] (which disclaims liability or warranty for this information). If you have questions about a medical condition or this instruction, always ask your healthcare professional. [address] any warranty or liability for your use of this information.

## 2017-03-17 NOTE — MR AVS SNAPSHOT
Visit Information Date & Time Provider Department Dept. Phone Encounter #  
 3/17/2017  2:00 PM Gaby Hernandes, Poppy Babcock [phone] [id number] Upcoming Health Maintenance Date Due  
 PAP AKA CERVICAL CYTOLOGY 5/5/2018 Allergies as of 3/17/2017  Review Complete On: 3/17/2017 By: Cindi Perez RN No Known Allergies Current Immunizations  Reviewed on 11/27/2016 Name Date Influenza Vaccine 11/1/2016 MMR 8/14/2014, 7/14/2014 TB Skin Test (PPD) 7/14/2014 TB Skin Test (PPD) Intradermal 8/4/2015, 7/14/2015 Tdap 7/10/2014 Not reviewed this visit Vitals BP Resp Height(growth percentile) Weight(growth percentile) LMP BMI  
 124/72 (BP 1 Location: Left arm, BP Patient Position: Sitting) 16 5' 9\" (1.753 m) 320 lb 9.6 oz (145.4 kg) 02/26/2017 (Exact Date) 47.34 kg/m2 OB Status Smoking Status Unknown Former Smoker BMI and BSA Data Body Mass Index Body Surface Area  
 47.34 kg/m 2 2.66 m 2 Preferred Pharmacy Pharmacy Name Phone [facility] [place], [address] [place] AT [facility] [phone] Your Updated Medication List  
  
   
This list is accurate as of: 3/17/17  2:31 PM.  Always use your most recent med list.  
  
  
  
  
 APRI 0.15-0.03 mg Tab Generic drug:  desogestrel-ethinyl estradiol Take 1 Tab by mouth daily. CLARITIN 10 mg tablet Generic drug:  loratadine Take 10 mg by mouth daily as needed for Allergies. estradiol 2 mg tablet Commonly known as:  ESTRACE  
TAKE 1 TABLET BY MOUTH EVERY DAY FOR 10 DAYS  
  
 ferrous sulfate 325 mg (65 mg iron) EC tablet Commonly known as:  IRON Take 1 Tab by mouth two (2) times daily (with meals). ibuprofen 800 mg tablet Commonly known as:  MOTRIN Take 1 Tab by mouth every eight (8) hours as needed for Pain. [facility] () 0.25-35 mg-mcg Tab Generic drug:  norgestimate-ethinyl estradiol TAKE 1 TABLET BY MOUTH EVERY DAY Patient Instructions Vaginal Bleeding in Nonpregnant Women: Care Instructions Your Care Instructions Many women have bleeding or spotting between periods. Lots of things can cause it. You may bleed because of hormone problems, stress, or ovulation. Fibroids and IUDs (intrauterine devices) can also cause bleeding. If your bleeding or spotting is caused by one of these things and is not heavy or doesn't happen often, you probably don't need to worry. But in rare cases, infection, cancer, or other serious conditions can cause bleeding. So you may need more tests to find the cause of your bleeding. The doctor has checked you carefully, but problems can develop later. If you notice any problems or new symptoms, get medical treatment right away. Follow-up care is a key part of your treatment and safety. Be sure to make and go to all appointments, and call your doctor if you are having problems. It's also a good idea to know your test results and keep a list of the medicines you take. How can you care for yourself at home? · Take pain medicines exactly as directed. ¨ If the doctor gave you a prescription medicine for pain, take it as prescribed. ¨ If you are not taking a prescription pain medicine, ask your doctor if you can take an over-the-counter medicine. Do not take aspirin, which may make bleeding worse. · If your doctor prescribed birth control pills for your bleeding, take them as directed. · Eat foods that are high in iron and vitamin C. Foods high in iron include red meat, shellfish, eggs, beans, and leafy green vegetables. Foods high in vitamin C include citrus fruits, tomatoes, and broccoli. Ask your doctor if you need to take iron pills or a multivitamin. · Ask your doctor when it is okay to have sex. When should you call for help? Call 911 anytime you think you may need emergency care. For example, call if: 
· You passed out (lost consciousness). · You have sudden, severe pain in your belly or pelvis. Call your doctor now or seek immediate medical care if: 
· You have severe vaginal bleeding. You are soaking through your usual pads or tampons each hour for 2 or more hours. · You are dizzy or lightheaded or you feel like you may faint. · You have new belly or pelvic pain. · You have a fever. · Your bleeding gets worse. Watch closely for changes in your health, and be sure to contact your doctor if: 
· You have new or worse vaginal discharge. · You do not get better as expected. Where can you learn more? Go to http://yumiko-maxine.info/. Enter I669 in the search box to learn more about \"Vaginal Bleeding in Nonpregnant Women: Care Instructions. \" Current as of: February 25, 2016 Content Version: 11.1 © 7470-5920 [employer]. Care instructions adapted under license by [employer] (which disclaims liability or warranty for this information). If you have questions about a medical condition or this instruction, always ask your healthcare professional. [place] [address] any warranty or liability for your use of this information. Introducing [facility] HE[facility] SERVICES! Dear Jonathan Tariq: Thank you for requesting a Food[employer]ee account. Our records indicate that you already have an active [email or web address] account. You can access your account anytime at [email or web address] Did you know that you can access your hospital and ER discharge instructions at any time in Food.ee? You can also review all of your test results from your hospital stay or ER visit. Additional Information If you have questions, please visit the Frequently Asked Questions section of the [email or web address] website at [email or web address] Remember, [email or web address] is NOT to be used for urgent needs. For medical emergencies, dial 911. Now available from your iPhone and Android! Please provide this summary of care documentation to your next provider. Your primary care clinician is listed as Timbo Form. If you have any questions after today's visit, please call [phone].

## 2017-03-18 LAB
ERYTHROCYTE [DISTWIDTH] IN BLOOD BY AUTOMATED COUNT: 16.4 % (ref 12.3–15.4)
HCT VFR BLD AUTO: 25.4 % (ref 34–46.6)
HGB BLD-MCNC: 8 G/DL (ref 11.1–15.9)
MCH RBC QN AUTO: 30 PG (ref 26.6–33)
MCHC RBC AUTO-ENTMCNC: 31.5 G/DL (ref 31.5–35.7)
MCV RBC AUTO: 95 FL (ref 79–97)
PLATELET # BLD AUTO: 395 X10E3/UL (ref 150–379)
RBC # BLD AUTO: 2.67 X10E6/UL (ref 3.77–5.28)
WBC # BLD AUTO: 9.6 X10E3/UL (ref 3.4–10.8)

## 2017-04-04 ENCOUNTER — OFFICE VISIT (OUTPATIENT)
Dept: OBGYN CLINIC | Age: 32
End: 2017-04-04

## 2017-04-04 DIAGNOSIS — N93.9 ABNORMAL UTERINE BLEEDING: Primary | ICD-10-CM

## 2017-04-04 LAB
HCG URINE, QL. (POC): NEGATIVE
VALID INTERNAL CONTROL?: YES

## 2017-04-04 RX ORDER — MEGESTROL ACETATE 40 MG/1
40 TABLET ORAL DAILY
Qty: 30 TAB | Refills: 2 | Status: SHIPPED | OUTPATIENT
Start: 2017-04-04 | End: 2017-05-04

## 2017-04-04 RX ORDER — MEGESTROL ACETATE 40 MG/1
40 TABLET ORAL DAILY
Qty: 30 TAB | Refills: 2 | Status: SHIPPED | OUTPATIENT
Start: 2017-04-04 | End: 2017-04-04 | Stop reason: SDUPTHER

## 2017-04-04 NOTE — PROGRESS NOTES
[facility]  OFFICE PROCEDURE PROGRESS NOTE        Chart reviewed for the following:   Lorna Noriega RN, have reviewed the History, Physical and updated the Allergic reactions for [address] performed immediately prior to start of procedure:   Lorna Noriega RN, have performed the following reviews on Amadou Decker prior to the start of the procedure:            * Patient was identified by name and date of birth   * Agreement on procedure being performed was verified  * Risks and Benefits explained to the patient  * Procedure site verified and marked as necessary  * Patient was positioned for comfort  * Consent was signed and verified     Time: 1130am      Date of procedure: 4/4/2017    Procedure performed by:  Ronel Dorsey MD    Provider assisted by: Anthony Lambert RN    Patient assisted by: self    How tolerated by patient: tolerated the procedure well with no complications    Post Procedural Pain Scale: 2 - Hurts Little Bit    Comments: none        SONOHYSTEROGRAPHY     Amadou Decker is a,  36 y.o. female [facility] whose Patient's last menstrual period was 02/26/2017 (exact date). Who presents for a sonohysterography. The indications for this procedure were reviewed with the patient (AUB, menorrhagia to anemia, anovulatory cycles, unopposed estrogen). The procedure was explained in detail and all questions were answered. Procedure: The patient was placed in the lithotomy position. A graves speculum was introduced into the vagina and the cervix was visualized. The cervix was prepped with zepharin solution. A Lumora's Hysterography catheter was then introduced into the uterine cavity and the speculum was removed. Sterile sonohysterography with 3D Reconstruction was performed. The endometrial cavity infiltrated with sterile saline, however, still with limited visualization of the endometrial cavity (poor distension achieved).   The findings are as follows: ?endometrial thickening vs. Polyp (see formal report)  The patient tolerated the procedure well without complication, and was discharged to home. ENDOMETRIAL BIOPSY    Gosia Myles is a 36 y.o. female [facility] whose Patient's last menstrual period was 02/26/2017 (exact date). The patient has a history of  AUB, menorrhagia to anemia, unopposed estrogen, and presents for an endometrial biopsy. Indications: AUB, menorrhagia to anemia, unopposed estrogen  After the indications, risks, benefits, and alternatives to performing an endometrial biopsy were explained to the patient, her questions were answered and informed consent was obtained. Procedure: The patient was placed on the table in the dorsal lithotomy position. A bimanual exam showed the uterus to be anterior. The uterus was normal size. A speculum was placed in the vagina. The cervix was visualized and prepped with zephrin. A tenaculum was placed on the anterior lip of the cervix for traction. It was not necessary to dilate the cervix. An Explora Catheter was passed through the endocervical canal without difficulty. The uterus was sounded to 8.5cm . A moderate amount of tissue was returned. This tissue was placed in formalin and sent to pathology. It was felt that an adequate sample was obtained. The patient tolerated the procedure well and she reported mild cramping. The tenaculum and speculum were removed. Post Procedural Status: The patient was observed for 20 min. She had mild cramping at the time of discharge. There were no complications. The patient was discharged in stable condition. Assessment/Plan:   36 y.o. G0 presenting for follow up evaluation of AUB - suspect anovulatory cycles due to morbid obesity. Pt also with menorrhagia to anemia, requiring recent hospitalization, IV estrogen d/t Hgb 5.8.  Most recent Hgb 8.0, bleeding improved at this time, now just light daily bleeding/spotting on OCPs (Sprintec) and iron. Unable to get adequate visualization with SIS procedure today, but pt with thickened EMS and ?polyp. Decision made to hold on Mirena IUD insertion today, but discussed recommendation for hysteroscopy, D&C, possibly Myosure Polypectomy, and Mirena insertion procedure in the OR if normal cavity. Discussed various R/B/A to therapy, pt in agreement with planned surgery at this time. -pt counseled concerning risks of surgery include uterine perforation, bleeding, transfusion, infection, readmission, abscess drainage, injury to abdominal organs including bowel, bladder, ureters, vessels, nerves, need for additional surgery, injury may not be recognized at time of surgery, and risk of death  -reviewed expectations for post-operative course (both in hospital and after hospital discharge)  -pt consents to blood transfusion in event of emergency   -surgical consents completed today, pt questions answered  -Paige Prabhakar to schedule surgery in 1 month. Will need PAT, and pre-op CBC.   -reviewed bleeding precautions, pad/tampon counts  -NSAIDs prn cramping/bleeding  -cont iron TID until surgery  -Rx for Megace to suppress bleeding until surgery (d/c sprintec)    RTC: in 1 month for surgery, or sooner for any problems or concerns  -instructions and handouts given to patient, pt verbalized understanding of tx plan    Ronel Dorsey MD  4/4/2017  3:00 PM

## 2017-04-04 NOTE — PATIENT INSTRUCTIONS
Abnormal Uterine Bleeding: Care Instructions  Your Care Instructions  Abnormal uterine bleeding (AUB) is irregular bleeding from the uterus that is longer or heavier than usual or does not occur at your regular time. Sometimes it is caused by changes in hormone levels. It can also be caused by growths in the uterus, such as fibroids or polyps. Sometimes a cause cannot be found. You may have heavy bleeding when you are not expecting your period. Your doctor may suggest a pregnancy test, if you think you are pregnant. Follow-up care is a key part of your treatment and safety. Be sure to make and go to all appointments, and call your doctor if you are having problems. It's also a good idea to know your test results and keep a list of the medicines you take. How can you care for yourself at home? · Be safe with medicines. Take pain medicines exactly as directed. ¨ If the doctor gave you a prescription medicine for pain, take it as prescribed. ¨ If you are not taking a prescription pain medicine, ask your doctor if you can take an over-the-counter medicine. · You may be low in iron because of blood loss. Eat a balanced diet that is high in iron and vitamin C. Foods rich in iron include red meat, shellfish, eggs, beans, and leafy green vegetables. Talk to your doctor about whether you need to take iron pills or a multivitamin. When should you call for help? Call 911 anytime you think you may need emergency care. For example, call if:  · You passed out (lost consciousness). Call your doctor now or seek immediate medical care if:  · You have sudden, severe pain in your belly or pelvis. · You have severe vaginal bleeding. You are soaking through your usual pads or tampons every hour for 2 or more hours. · You feel dizzy or lightheaded, or you feel like you may faint. Watch closely for changes in your health, and be sure to contact your doctor if:  · You have new belly or pelvic pain.   · You have a fever.  · Your bleeding gets worse or lasts longer than 1 week. · You think you may be pregnant. Where can you learn more? Go to http://yumiko-maxine.info/. Enter V128 in the search box to learn more about \"Abnormal Uterine Bleeding: Care Instructions. \"  Current as of: October 13, 2016  Content Version: 11.2  © 4017-9825 [employer]. Care instructions adapted under license by [employer] (which disclaims liability or warranty for this information). If you have questions about a medical condition or this instruction, always ask your healthcare professional. [place] [address] any warranty or liability for your use of this information.

## 2017-04-04 NOTE — MR AVS SNAPSHOT
Visit Information Date & Time Provider Department Dept. Phone Encounter #  
 4/4/2017 11:00 AM Manuel Lyn [phone] [id number] Upcoming Health Maintenance Date Due  
 PAP AKA CERVICAL CYTOLOGY 5/5/2018 Allergies as of 4/4/2017  Review Complete On: 4/4/2017 By: Maykel Luna RN No Known Allergies Current Immunizations  Reviewed on 11/27/2016 Name Date Influenza Vaccine 11/1/2016 MMR 8/14/2014, 7/14/2014 TB Skin Test (PPD) 7/14/2014 TB Skin Test (PPD) Intradermal 8/4/2015, 7/14/2015 Tdap 7/10/2014 Not reviewed this visit You Were Diagnosed With   
  
 Codes Comments Abnormal uterine bleeding    -  Primary ICD-10-CM: N93.9 ICD-9-CM: 626.9 Vitals LMP OB Status Smoking Status 02/26/2017 (Exact Date) Unknown Former Smoker Preferred Pharmacy Pharmacy Name Phone [facility] [place], [address] [place] AT [facility] [phone] Your Updated Medication List  
  
   
This list is accurate as of: 4/4/17 12:07 PM.  Always use your most recent med list.  
  
  
  
  
 CLARITIN 10 mg tablet Generic drug:  loratadine Take 10 mg by mouth daily as needed for Allergies. ferrous sulfate 325 mg (65 mg iron) EC tablet Commonly known as:  IRON Take 1 Tab by mouth two (2) times daily (with meals). ibuprofen 800 mg tablet Commonly known as:  MOTRIN Take 1 Tab by mouth every eight (8) hours as needed for Pain. [address] [facility] () 0.25-35 mg-mcg Tab Generic drug:  norgestimate-ethinyl estradiol TAKE 1 TABLET BY MOUTH EVERY DAY We Performed the Following BIOPSY OF UTERUS LINING [10520 CPT(R)] SURGICAL PATHOLOGY [XKI2208 Custom] Patient Instructions Abnormal Uterine Bleeding: Care Instructions Your Care Instructions Abnormal uterine bleeding (AUB) is irregular bleeding from the uterus that is longer or heavier than usual or does not occur at your regular time. Sometimes it is caused by changes in hormone levels. It can also be caused by growths in the uterus, such as fibroids or polyps. Sometimes a cause cannot be found. You may have heavy bleeding when you are not expecting your period. Your doctor may suggest a pregnancy test, if you think you are pregnant. Follow-up care is a key part of your treatment and safety. Be sure to make and go to all appointments, and call your doctor if you are having problems. It's also a good idea to know your test results and keep a list of the medicines you take. How can you care for yourself at home? · Be safe with medicines. Take pain medicines exactly as directed. ¨ If the doctor gave you a prescription medicine for pain, take it as prescribed. ¨ If you are not taking a prescription pain medicine, ask your doctor if you can take an over-the-counter medicine. · You may be low in iron because of blood loss. Eat a balanced diet that is high in iron and vitamin C. Foods rich in iron include red meat, shellfish, eggs, beans, and leafy green vegetables. Talk to your doctor about whether you need to take iron pills or a multivitamin. When should you call for help? Call 911 anytime you think you may need emergency care. For example, call if: 
· You passed out (lost consciousness). Call your doctor now or seek immediate medical care if: 
· You have sudden, severe pain in your belly or pelvis. · You have severe vaginal bleeding. You are soaking through your usual pads or tampons every hour for 2 or more hours. · You feel dizzy or lightheaded, or you feel like you may faint. Watch closely for changes in your health, and be sure to contact your doctor if: 
· You have new belly or pelvic pain. · You have a fever. · Your bleeding gets worse or lasts longer than 1 week. · You think you may be pregnant. Where can you learn more? Go to http://yumiko-maxine.info/. Enter E348 in the search box to learn more about \"Abnormal Uterine Bleeding: Care Instructions. \" Current as of: October 13, 2016 Content Version: 11.2 © 6789-3463 [employer]. Care instructions adapted under license by [employer] (which disclaims liability or warranty for this information). If you have questions about a medical condition or this instruction, always ask your healthcare professional. [address] any warranty or liability for your use of this information. Introducing [facility]! Dear [address]: Thank you for requesting a [employer] account. Our records indicate that you already have an active [employer] account. You can access your account anytime at [email or web address] Did you know that you can access your hospital and ER discharge instructions at any time in [employer]? You can also review all of your test results from your hospital stay or ER visit. Additional Information If you have questions, please visit the Frequently Asked Questions section of the [employer] website at [email or web address] Remember, [employer] is NOT to be used for urgent needs. For medical emergencies, dial 911. Now available from your iPhone and Android! Please provide this summary of care documentation to your next provider. Your primary care clinician is listed as Gregoria Bush. If you have any questions after today's visit, please call [phone].

## 2017-04-07 LAB
DX ICD CODE: NORMAL
PATH REPORT.FINAL DX SPEC: NORMAL
PATH REPORT.GROSS SPEC: NORMAL
PATH REPORT.SITE OF ORIGIN SPEC: NORMAL
PATHOLOGIST NAME: NORMAL
PAYMENT PROCEDURE: NORMAL

## 2017-04-07 NOTE — PROGRESS NOTES
Please inform patient that endometrial biopsy showed benign polyps. We will continue with plan for hysteroscopy/D&C/polypectomy/Mirena insertion.     Mushtaq Evans MD  4/7/2017  2:52 PM

## 2017-04-17 DIAGNOSIS — N93.9 ABNORMAL UTERINE BLEEDING: Primary | ICD-10-CM

## 2017-04-19 ENCOUNTER — TELEPHONE (OUTPATIENT)
Dept: FAMILY MEDICINE CLINIC | Age: 32
End: 2017-04-19

## 2017-04-20 NOTE — TELEPHONE ENCOUNTER
You saw her 3/15/17, I will let you know when the form is in your box, if you would be willing to fill out or if she will need another appointment .

## 2017-05-08 ENCOUNTER — HOSPITAL ENCOUNTER (OUTPATIENT)
Dept: PREADMISSION TESTING | Age: 32
Discharge: HOME | End: 2017-05-08
Payer: COMMERCIAL

## 2017-05-08 VITALS
BODY MASS INDEX: 43.4 KG/M2 | DIASTOLIC BLOOD PRESSURE: 79 MMHG | HEART RATE: 104 BPM | HEIGHT: 69 IN | OXYGEN SATURATION: 96 % | WEIGHT: 293 LBS | SYSTOLIC BLOOD PRESSURE: 120 MMHG | TEMPERATURE: 98.4 F

## 2017-05-08 DIAGNOSIS — N93.9 ABNORMAL UTERINE BLEEDING: ICD-10-CM

## 2017-05-08 LAB
ABO + RH BLD: NORMAL
ANION GAP BLD CALC-SCNC: 7 MMOL/L (ref 5–15)
BLOOD GROUP ANTIBODIES SERPL: NORMAL
BUN SERPL-MCNC: 7 MG/DL (ref 6–20)
BUN/CREAT SERPL: 11 (ref 12–20)
CALCIUM SERPL-MCNC: 8.8 MG/DL (ref 8.5–10.1)
CHLORIDE SERPL-SCNC: 110 MMOL/L (ref 97–108)
CO2 SERPL-SCNC: 25 MMOL/L (ref 21–32)
CREAT SERPL-MCNC: 0.66 MG/DL (ref 0.55–1.02)
ERYTHROCYTE [DISTWIDTH] IN BLOOD BY AUTOMATED COUNT: 12.8 % (ref 11.5–14.5)
GLUCOSE SERPL-MCNC: 99 MG/DL (ref 65–100)
HCG SERPL QL: NEGATIVE
HCT VFR BLD AUTO: 34.2 % (ref 35–47)
HGB BLD-MCNC: 10.8 G/DL (ref 11.5–16)
MCH RBC QN AUTO: 26.2 PG (ref 26–34)
MCHC RBC AUTO-ENTMCNC: 31.6 G/DL (ref 30–36.5)
MCV RBC AUTO: 83 FL (ref 80–99)
PLATELET # BLD AUTO: 233 K/UL (ref 150–400)
POTASSIUM SERPL-SCNC: 4.3 MMOL/L (ref 3.5–5.1)
RBC # BLD AUTO: 4.12 M/UL (ref 3.8–5.2)
SODIUM SERPL-SCNC: 142 MMOL/L (ref 136–145)
SPECIMEN EXP DATE BLD: NORMAL
WBC # BLD AUTO: 6.7 K/UL (ref 3.6–11)

## 2017-05-08 PROCEDURE — 86900 BLOOD TYPING SEROLOGIC ABO: CPT | Performed by: OBSTETRICS & GYNECOLOGY

## 2017-05-08 PROCEDURE — 85027 COMPLETE CBC AUTOMATED: CPT | Performed by: OBSTETRICS & GYNECOLOGY

## 2017-05-08 PROCEDURE — 36415 COLL VENOUS BLD VENIPUNCTURE: CPT | Performed by: OBSTETRICS & GYNECOLOGY

## 2017-05-08 PROCEDURE — 80048 BASIC METABOLIC PNL TOTAL CA: CPT | Performed by: OBSTETRICS & GYNECOLOGY

## 2017-05-08 PROCEDURE — 84703 CHORIONIC GONADOTROPIN ASSAY: CPT | Performed by: OBSTETRICS & GYNECOLOGY

## 2017-05-08 RX ORDER — PROGESTERONE 100 MG/1
100 CAPSULE ORAL DAILY
COMMUNITY
End: 2017-05-15

## 2017-05-11 ENCOUNTER — ANESTHESIA EVENT (OUTPATIENT)
Dept: SURGERY | Age: 32
End: 2017-05-11
Payer: COMMERCIAL

## 2017-05-15 ENCOUNTER — HOSPITAL ENCOUNTER (OUTPATIENT)
Age: 32
Setting detail: OUTPATIENT SURGERY
Discharge: HOME | End: 2017-05-15
Attending: OBSTETRICS & GYNECOLOGY | Admitting: OBSTETRICS & GYNECOLOGY
Payer: COMMERCIAL

## 2017-05-15 ENCOUNTER — ANESTHESIA (OUTPATIENT)
Dept: SURGERY | Age: 32
End: 2017-05-15
Payer: COMMERCIAL

## 2017-05-15 VITALS
OXYGEN SATURATION: 100 % | BODY MASS INDEX: 43.4 KG/M2 | RESPIRATION RATE: 15 BRPM | DIASTOLIC BLOOD PRESSURE: 82 MMHG | HEART RATE: 87 BPM | TEMPERATURE: 97.6 F | HEIGHT: 69 IN | WEIGHT: 293 LBS | SYSTOLIC BLOOD PRESSURE: 113 MMHG

## 2017-05-15 DIAGNOSIS — N93.9 ABNORMAL UTERINE BLEEDING: ICD-10-CM

## 2017-05-15 LAB
ABO + RH BLD: NORMAL
BLOOD GROUP ANTIBODIES SERPL: NORMAL
GLUCOSE BLD STRIP.AUTO-MCNC: 110 MG/DL (ref 65–100)
HCG UR QL: NEGATIVE
SERVICE CMNT-IMP: ABNORMAL
SPECIMEN EXP DATE BLD: NORMAL

## 2017-05-15 PROCEDURE — 77030035721 HC FLD MGMT SYMPHION KT DISP BSC -F: Performed by: OBSTETRICS & GYNECOLOGY

## 2017-05-15 PROCEDURE — 76210000021 HC REC RM PH II 0.5 TO 1 HR: Performed by: OBSTETRICS & GYNECOLOGY

## 2017-05-15 PROCEDURE — 82962 GLUCOSE BLOOD TEST: CPT

## 2017-05-15 PROCEDURE — 77030033135 HC DEV TISS RMVL HYSTSCP MYOSUR HOLO -G1: Performed by: OBSTETRICS & GYNECOLOGY

## 2017-05-15 PROCEDURE — 76210000006 HC OR PH I REC 0.5 TO 1 HR: Performed by: OBSTETRICS & GYNECOLOGY

## 2017-05-15 PROCEDURE — 76010000153 HC OR TIME 1.5 TO 2 HR: Performed by: OBSTETRICS & GYNECOLOGY

## 2017-05-15 PROCEDURE — 76060000034 HC ANESTHESIA 1.5 TO 2 HR: Performed by: OBSTETRICS & GYNECOLOGY

## 2017-05-15 PROCEDURE — 74011250636 HC RX REV CODE- 250/636

## 2017-05-15 PROCEDURE — 74011000250 HC RX REV CODE- 250

## 2017-05-15 PROCEDURE — 77030005537 HC CATH URETH BARD -A: Performed by: OBSTETRICS & GYNECOLOGY

## 2017-05-15 PROCEDURE — 77030013079 HC BLNKT BAIR HGGR 3M -A: Performed by: ANESTHESIOLOGY

## 2017-05-15 PROCEDURE — 77030033137 HC TBNG OUTFLO AQUILEX ST HOLO -B: Performed by: OBSTETRICS & GYNECOLOGY

## 2017-05-15 PROCEDURE — 77030018836 HC SOL IRR NACL ICUM -A: Performed by: OBSTETRICS & GYNECOLOGY

## 2017-05-15 PROCEDURE — 81025 URINE PREGNANCY TEST: CPT

## 2017-05-15 PROCEDURE — 88305 TISSUE EXAM BY PATHOLOGIST: CPT | Performed by: OBSTETRICS & GYNECOLOGY

## 2017-05-15 PROCEDURE — 77030011226 HC DEV UTER ABLT HOLO -G1: Performed by: OBSTETRICS & GYNECOLOGY

## 2017-05-15 PROCEDURE — 77030033136 HC TBNG INFLO AQUILEX ST HOLO -C: Performed by: OBSTETRICS & GYNECOLOGY

## 2017-05-15 PROCEDURE — 74011250636 HC RX REV CODE- 250/636: Performed by: ANESTHESIOLOGY

## 2017-05-15 PROCEDURE — 77030010509 HC AIRWY LMA MSK TELE -A: Performed by: ANESTHESIOLOGY

## 2017-05-15 PROCEDURE — 36415 COLL VENOUS BLD VENIPUNCTURE: CPT | Performed by: OBSTETRICS & GYNECOLOGY

## 2017-05-15 PROCEDURE — 86900 BLOOD TYPING SEROLOGIC ABO: CPT | Performed by: OBSTETRICS & GYNECOLOGY

## 2017-05-15 RX ORDER — ACETAMINOPHEN 10 MG/ML
INJECTION, SOLUTION INTRAVENOUS AS NEEDED
Status: DISCONTINUED | OUTPATIENT
Start: 2017-05-15 | End: 2017-05-15 | Stop reason: HOSPADM

## 2017-05-15 RX ORDER — MIDAZOLAM HYDROCHLORIDE 1 MG/ML
1 INJECTION, SOLUTION INTRAMUSCULAR; INTRAVENOUS AS NEEDED
Status: DISCONTINUED | OUTPATIENT
Start: 2017-05-15 | End: 2017-05-15 | Stop reason: HOSPADM

## 2017-05-15 RX ORDER — SODIUM CHLORIDE 9 MG/ML
25 INJECTION, SOLUTION INTRAVENOUS CONTINUOUS
Status: DISCONTINUED | OUTPATIENT
Start: 2017-05-15 | End: 2017-05-15 | Stop reason: HOSPADM

## 2017-05-15 RX ORDER — MIDAZOLAM HYDROCHLORIDE 1 MG/ML
0.5 INJECTION, SOLUTION INTRAMUSCULAR; INTRAVENOUS
Status: DISCONTINUED | OUTPATIENT
Start: 2017-05-15 | End: 2017-05-15 | Stop reason: HOSPADM

## 2017-05-15 RX ORDER — SODIUM CHLORIDE, SODIUM LACTATE, POTASSIUM CHLORIDE, CALCIUM CHLORIDE 600; 310; 30; 20 MG/100ML; MG/100ML; MG/100ML; MG/100ML
25 INJECTION, SOLUTION INTRAVENOUS CONTINUOUS
Status: DISCONTINUED | OUTPATIENT
Start: 2017-05-15 | End: 2017-05-15 | Stop reason: HOSPADM

## 2017-05-15 RX ORDER — ROPIVACAINE HYDROCHLORIDE 5 MG/ML
30 INJECTION, SOLUTION EPIDURAL; INFILTRATION; PERINEURAL AS NEEDED
Status: DISCONTINUED | OUTPATIENT
Start: 2017-05-15 | End: 2017-05-15 | Stop reason: HOSPADM

## 2017-05-15 RX ORDER — MORPHINE SULFATE 10 MG/ML
2 INJECTION, SOLUTION INTRAMUSCULAR; INTRAVENOUS
Status: DISCONTINUED | OUTPATIENT
Start: 2017-05-15 | End: 2017-05-15 | Stop reason: HOSPADM

## 2017-05-15 RX ORDER — DEXAMETHASONE SODIUM PHOSPHATE 4 MG/ML
INJECTION, SOLUTION INTRA-ARTICULAR; INTRALESIONAL; INTRAMUSCULAR; INTRAVENOUS; SOFT TISSUE AS NEEDED
Status: DISCONTINUED | OUTPATIENT
Start: 2017-05-15 | End: 2017-05-15 | Stop reason: HOSPADM

## 2017-05-15 RX ORDER — FENTANYL CITRATE 50 UG/ML
50 INJECTION, SOLUTION INTRAMUSCULAR; INTRAVENOUS AS NEEDED
Status: DISCONTINUED | OUTPATIENT
Start: 2017-05-15 | End: 2017-05-15 | Stop reason: HOSPADM

## 2017-05-15 RX ORDER — ONDANSETRON 2 MG/ML
4 INJECTION INTRAMUSCULAR; INTRAVENOUS AS NEEDED
Status: DISCONTINUED | OUTPATIENT
Start: 2017-05-15 | End: 2017-05-15 | Stop reason: HOSPADM

## 2017-05-15 RX ORDER — SODIUM CHLORIDE 0.9 % (FLUSH) 0.9 %
5-10 SYRINGE (ML) INJECTION AS NEEDED
Status: DISCONTINUED | OUTPATIENT
Start: 2017-05-15 | End: 2017-05-15 | Stop reason: HOSPADM

## 2017-05-15 RX ORDER — FENTANYL CITRATE 50 UG/ML
25 INJECTION, SOLUTION INTRAMUSCULAR; INTRAVENOUS
Status: DISCONTINUED | OUTPATIENT
Start: 2017-05-15 | End: 2017-05-15 | Stop reason: HOSPADM

## 2017-05-15 RX ORDER — FENTANYL CITRATE 50 UG/ML
INJECTION, SOLUTION INTRAMUSCULAR; INTRAVENOUS AS NEEDED
Status: DISCONTINUED | OUTPATIENT
Start: 2017-05-15 | End: 2017-05-15 | Stop reason: HOSPADM

## 2017-05-15 RX ORDER — KETOROLAC TROMETHAMINE 30 MG/ML
INJECTION, SOLUTION INTRAMUSCULAR; INTRAVENOUS AS NEEDED
Status: DISCONTINUED | OUTPATIENT
Start: 2017-05-15 | End: 2017-05-15 | Stop reason: HOSPADM

## 2017-05-15 RX ORDER — SODIUM CHLORIDE 0.9 % (FLUSH) 0.9 %
5-10 SYRINGE (ML) INJECTION EVERY 8 HOURS
Status: DISCONTINUED | OUTPATIENT
Start: 2017-05-15 | End: 2017-05-15 | Stop reason: HOSPADM

## 2017-05-15 RX ORDER — PROPOFOL 10 MG/ML
INJECTION, EMULSION INTRAVENOUS AS NEEDED
Status: DISCONTINUED | OUTPATIENT
Start: 2017-05-15 | End: 2017-05-15 | Stop reason: HOSPADM

## 2017-05-15 RX ORDER — LIDOCAINE HYDROCHLORIDE 10 MG/ML
0.1 INJECTION, SOLUTION EPIDURAL; INFILTRATION; INTRACAUDAL; PERINEURAL AS NEEDED
Status: DISCONTINUED | OUTPATIENT
Start: 2017-05-15 | End: 2017-05-15 | Stop reason: HOSPADM

## 2017-05-15 RX ORDER — HYDROCODONE BITARTRATE AND ACETAMINOPHEN 5; 325 MG/1; MG/1
1 TABLET ORAL
Qty: 5 TAB | Refills: 0 | Status: SHIPPED | OUTPATIENT
Start: 2017-05-15 | End: 2017-05-16

## 2017-05-15 RX ORDER — HYDROMORPHONE HYDROCHLORIDE 1 MG/ML
0.2 INJECTION, SOLUTION INTRAMUSCULAR; INTRAVENOUS; SUBCUTANEOUS
Status: DISCONTINUED | OUTPATIENT
Start: 2017-05-15 | End: 2017-05-15 | Stop reason: HOSPADM

## 2017-05-15 RX ORDER — ONDANSETRON 2 MG/ML
INJECTION INTRAMUSCULAR; INTRAVENOUS AS NEEDED
Status: DISCONTINUED | OUTPATIENT
Start: 2017-05-15 | End: 2017-05-15 | Stop reason: HOSPADM

## 2017-05-15 RX ORDER — SODIUM CHLORIDE, SODIUM LACTATE, POTASSIUM CHLORIDE, CALCIUM CHLORIDE 600; 310; 30; 20 MG/100ML; MG/100ML; MG/100ML; MG/100ML
100 INJECTION, SOLUTION INTRAVENOUS CONTINUOUS
Status: DISCONTINUED | OUTPATIENT
Start: 2017-05-15 | End: 2017-05-15 | Stop reason: HOSPADM

## 2017-05-15 RX ORDER — MIDAZOLAM HYDROCHLORIDE 1 MG/ML
INJECTION, SOLUTION INTRAMUSCULAR; INTRAVENOUS AS NEEDED
Status: DISCONTINUED | OUTPATIENT
Start: 2017-05-15 | End: 2017-05-15 | Stop reason: HOSPADM

## 2017-05-15 RX ORDER — LIDOCAINE HYDROCHLORIDE 20 MG/ML
INJECTION, SOLUTION EPIDURAL; INFILTRATION; INTRACAUDAL; PERINEURAL AS NEEDED
Status: DISCONTINUED | OUTPATIENT
Start: 2017-05-15 | End: 2017-05-15 | Stop reason: HOSPADM

## 2017-05-15 RX ORDER — SODIUM CHLORIDE, SODIUM LACTATE, POTASSIUM CHLORIDE, CALCIUM CHLORIDE 600; 310; 30; 20 MG/100ML; MG/100ML; MG/100ML; MG/100ML
INJECTION, SOLUTION INTRAVENOUS
Status: DISCONTINUED | OUTPATIENT
Start: 2017-05-15 | End: 2017-05-15 | Stop reason: HOSPADM

## 2017-05-15 RX ORDER — DIPHENHYDRAMINE HYDROCHLORIDE 50 MG/ML
12.5 INJECTION, SOLUTION INTRAMUSCULAR; INTRAVENOUS AS NEEDED
Status: DISCONTINUED | OUTPATIENT
Start: 2017-05-15 | End: 2017-05-15 | Stop reason: HOSPADM

## 2017-05-15 RX ADMIN — FENTANYL CITRATE 25 MCG: 50 INJECTION, SOLUTION INTRAMUSCULAR; INTRAVENOUS at 08:16

## 2017-05-15 RX ADMIN — LIDOCAINE HYDROCHLORIDE 60 MG: 20 INJECTION, SOLUTION EPIDURAL; INFILTRATION; INTRACAUDAL; PERINEURAL at 07:36

## 2017-05-15 RX ADMIN — ONDANSETRON 4 MG: 2 INJECTION INTRAMUSCULAR; INTRAVENOUS at 09:32

## 2017-05-15 RX ADMIN — ONDANSETRON 4 MG: 2 INJECTION INTRAMUSCULAR; INTRAVENOUS at 08:41

## 2017-05-15 RX ADMIN — FENTANYL CITRATE 25 MCG: 50 INJECTION, SOLUTION INTRAMUSCULAR; INTRAVENOUS at 07:42

## 2017-05-15 RX ADMIN — MIDAZOLAM HYDROCHLORIDE 2 MG: 1 INJECTION, SOLUTION INTRAMUSCULAR; INTRAVENOUS at 07:27

## 2017-05-15 RX ADMIN — FENTANYL CITRATE 50 MCG: 50 INJECTION, SOLUTION INTRAMUSCULAR; INTRAVENOUS at 07:36

## 2017-05-15 RX ADMIN — ACETAMINOPHEN 1000 MG: 10 INJECTION, SOLUTION INTRAVENOUS at 07:40

## 2017-05-15 RX ADMIN — DEXAMETHASONE SODIUM PHOSPHATE 4 MG: 4 INJECTION, SOLUTION INTRA-ARTICULAR; INTRALESIONAL; INTRAMUSCULAR; INTRAVENOUS; SOFT TISSUE at 08:05

## 2017-05-15 RX ADMIN — FENTANYL CITRATE 25 MCG: 50 INJECTION, SOLUTION INTRAMUSCULAR; INTRAVENOUS at 08:10

## 2017-05-15 RX ADMIN — FENTANYL CITRATE 50 MCG: 50 INJECTION, SOLUTION INTRAMUSCULAR; INTRAVENOUS at 08:36

## 2017-05-15 RX ADMIN — FENTANYL CITRATE 25 MCG: 50 INJECTION, SOLUTION INTRAMUSCULAR; INTRAVENOUS at 07:47

## 2017-05-15 RX ADMIN — FENTANYL CITRATE 25 MCG: 50 INJECTION, SOLUTION INTRAMUSCULAR; INTRAVENOUS at 07:39

## 2017-05-15 RX ADMIN — FENTANYL CITRATE 25 MCG: 50 INJECTION, SOLUTION INTRAMUSCULAR; INTRAVENOUS at 08:01

## 2017-05-15 RX ADMIN — PROPOFOL 200 MG: 10 INJECTION, EMULSION INTRAVENOUS at 07:36

## 2017-05-15 RX ADMIN — SODIUM CHLORIDE, SODIUM LACTATE, POTASSIUM CHLORIDE, CALCIUM CHLORIDE: 600; 310; 30; 20 INJECTION, SOLUTION INTRAVENOUS at 07:18

## 2017-05-15 RX ADMIN — KETOROLAC TROMETHAMINE 30 MG: 30 INJECTION, SOLUTION INTRAMUSCULAR; INTRAVENOUS at 08:41

## 2017-05-15 NOTE — ANESTHESIA PREPROCEDURE EVALUATION
Anesthetic History               Review of Systems / Medical History  Patient summary reviewed, nursing notes reviewed and pertinent labs reviewed    Pulmonary  Within defined limits                 Neuro/Psych   Within defined limits           Cardiovascular  Within defined limits                Exercise tolerance: >4 METS     GI/Hepatic/Renal  Within defined limits              Endo/Other        Morbid obesity and anemia     Other Findings              Physical Exam    Airway  Mallampati: II  TM Distance: > 6 cm  Neck ROM: normal range of motion, short neck   Mouth opening: Normal     Cardiovascular    Rhythm: regular  Rate: normal         Dental    Dentition: Lower dentition intact and Upper dentition intact     Pulmonary  Breath sounds clear to auscultation               Abdominal  GI exam deferred       Other Findings            Anesthetic Plan    ASA: 3  Anesthesia type: general          Induction: Intravenous  Anesthetic plan and risks discussed with: Patient

## 2017-05-15 NOTE — PERIOP NOTES
Straight cath done by Dr Gracia Catchterrance at the start of the procedure with 100 ml of clear yellow urine.

## 2017-05-15 NOTE — IP AVS SNAPSHOT
[address] [facility] [address] [place] [address] 
[phone] Patient: Maikol Quiros 
MRN: [id number] LAP:[id number]/48/[id number] You are allergic to the following Allergen Reactions Percocet (Oxycodone-Acetaminophen) Nausea and Vomiting Recent Documentation Height Weight BMI OB Status Smoking Status 1.753 m 142.9 kg 46.52 kg/m2 Medically Induced Former Smoker Emergency Contacts Name Discharge Info Relation Home Work Mobile Helen Osborn DISCHARGE CAREGIVER [3] Parent [1] [phone]    
 Brain Merritts [5] [phone] About your hospitalization You were admitted on:  May 15, 2017 You last received care in the:  [facility] PACU You were discharged on:  May 15, 2017 Unit phone number:  [phone] Why you were hospitalized Your primary diagnosis was:  Not on File Providers Seen During Your Hospitalizations Provider Role Specialty Primary office phone Mita Fonseca MD Attending Provider Obstetrics & Gynecology [phone] Your Primary Care Physician (PCP) Primary Care Physician Office Phone Office Fax Crystal Prakash [phone] Follow-up Information Follow up With Details Comments Contact Info Rylee Saleem MD   [address] [place] [address] 
[phone] Mita Fonseca MD Follow up in 6 week(s)  [place] [address] [address] [place] [address] 
[phone] Current Discharge Medication List  
  
START taking these medications Dose & Instructions Dispensing Information Comments Morning Noon Evening Bedtime HYDROcodone-acetaminophen 5-325 mg per tablet Commonly known as:  Almai Landaverde Your last dose was: Your next dose is:    
   
   
 Dose:  1 Tab Take 1 Tab by mouth every six (6) hours as needed for Pain for up to 1 day. Max Daily Amount: 4 Tabs. Quantity:  5 Tab Refills:  0 CONTINUE these medications which have NOT CHANGED Dose & Instructions Dispensing Information Comments Morning Noon Evening Bedtime CLARITIN 10 mg tablet Generic drug:  loratadine Your last dose was: Your next dose is:    
   
   
 Dose:  10 mg Take 10 mg by mouth daily as needed for Allergies. Refills:  0  
     
   
   
   
  
 ferrous sulfate 325 mg (65 mg iron) EC tablet Commonly known as:  IRON Your last dose was: Your next dose is:    
   
   
 Dose:  325 mg Take 1 Tab by mouth two (2) times daily (with meals). Quantity:  60 Tab Refills:  0  
     
   
   
   
  
 ibuprofen 800 mg tablet Commonly known as:  MOTRIN Your last dose was: Your next dose is:    
   
   
 Dose:  800 mg Take 1 Tab by mouth every eight (8) hours as needed for Pain. Quantity:  30 Tab Refills:  0 STOP taking these medications   
 progesterone 100 mg capsule Commonly known as:  PROMETRIUM Where to Get Your Medications Information on where to get these meds will be given to you by the nurse or doctor. ! Ask your nurse or doctor about these medications HYDROcodone-acetaminophen 5-325 mg per tablet Discharge Instructions POSTOPERATIVE INSTRUCTIONS 
FOR D&C, HYSTEROSCOPY, POLYPECTOMY, MIRENA IUD INSERTION 
 
 
A D&C is a minor procedure. Your recovery from each one of these procedures should be relatively quick and uneventful. There are a few points that we ask you to remember: 1. Absolutely no intercourse, douching or tampon use for two weeks. 2. You can expect to have some vaginal bleeding or bloody vaginal discharge for the  
 next 2 to 4 weeks. 3. You may take tub baths after one week and showers at any time. 4. You may resume normal everyday activities as you feel able and return to work  
 within 2-5 days after surgery. 5. Notify us if you experience:  
  a.  heavy vaginal bleeding or foul vaginal discharge 
  b.  temperature of 101° or greater 
  c.  severe pelvic or vaginal pain 6. Please call the office today at [phone] to schedule your checkup appointment  
 in 4-6 weeks. Above all, please notify us of a problem if it arises before we see you again. In an emergency, you may contact a doctor 24 hours a day at [phone]. Intrauterine Device (IUD) Insertion: Care Instructions Your Care Instructions The intrauterine device (IUD) is a very effective method of birth control. It is a small, plastic, T-shaped device that contains copper or hormones. The doctor inserts the IUD into your uterus. A plastic string tied to the end of the IUD hangs down through the cervix into the vagina. There are two types of IUDs. The copper IUD is effective for up to 10 years. The hormonal IUD is effective for either 3 years or 5 years, depending on which IUD is used. The hormonal IUD also reduces menstrual bleeding and cramping. Both types of IUD damage or kill the man's sperm. This means that the woman's egg does not join with the sperm. IUDs also change the lining of the uterus so that the egg does not lodge there. The IUD is most likely to work well for women who have been pregnant before. Some women who have never been pregnant have more trouble keeping the IUD in the uterus. They also may have more pain and cramping after insertion. Follow-up care is a key part of your treatment and safety. Be sure to make and go to all appointments, and call your doctor if you are having problems. It's also a good idea to know your test results and keep a list of the medicines you take. How can you care for yourself at home?  
· You may experience some mild cramping and light bleeding (spotting) for 1 or 2 days. Use a hot water bottle or a heating pad set on low on your belly for pain. · Take an over-the-counter pain medicine, such as acetaminophen (Tylenol), ibuprofen (Advil, Motrin), and naproxen (Aleve) if needed. Read and follow all instructions on the label. · Do not take two or more pain medicines at the same time unless the doctor told you to. Many pain medicines have acetaminophen, which is Tylenol. Too much acetaminophen (Tylenol) can be harmful. · Check the string of your IUD after every period. To do this, insert a finger into your vagina and feel for the cervix, which is at the top of the vagina and feels harder than the rest of your vagina. You should be able to feel the thin, plastic string coming out of the opening of your cervix. If you cannot feel the string, use another form of birth control and make an appointment with your doctor to have the string checked. · If the IUD comes out, save it and call your doctor. Be sure to use another form of birth control while the IUD is out. · Use latex condoms to protect against sexually transmitted infections (STIs), such as gonorrhea and chlamydia. An IUD does not protect you from STIs. Having one sex partner (who does not have STIs and does not have sex with anyone else) is a good way to avoid STIs. When should you call for help? Call 911 anytime you think you may need emergency care. For example, call if: 
· You passed out (lost consciousness). · You have sudden, severe pain in your belly or pelvis. Call your doctor now or seek immediate medical care if: 
· You have new belly or pelvic pain. · You have severe vaginal bleeding. This means that you are soaking through your usual pads or tampons each hour for 2 or more hours. · You are dizzy or lightheaded, or you feel like you may faint. · You have a fever and pelvic pain or vaginal discharge. · You have pelvic pain that is getting worse. Watch closely for changes in your health, and be sure to contact your doctor if: 
· You cannot feel the string, or the IUD comes out. · You feel sick to your stomach, or you vomit. · You think you may be pregnant. Where can you learn more? Go to http://yumiko-maxine.info/. Enter R704 in the search box to learn more about \"Intrauterine Device (IUD) Insertion: Care Instructions. \" Current as of: May 30, 2016 Content Version: 11.2 © 8094-0498 [employer]. Care instructions adapted under license by [employer] (which disclaims liability or warranty for this information). If you have questions about a medical condition or this instruction, always ask your healthcare professional. [address] any warranty or liability for your use of this information. After general anesthesia or intravenous sedation, for 24 hours or while taking prescription Narcotics: · Limit your activities · Do not drive and operate hazardous machinery · Do not make important personal or business decisions · Do  not drink alcoholic beverages · If you have not urinated within 8 hours after discharge, please contact your surgeon on call. Report the following to your surgeon: 
· Excessive pain, swelling, redness or odor of or around the surgical area · Temperature over 100.5 · Nausea and vomiting lasting longer than 4 hours or if unable to take medications · Any signs of decreased circulation or nerve impairment to extremity: change in color, persistent  numbness, tingling, coldness or increase pain · Any questions Saqib Covarrubias Discharge Orders None [facility] Announcement We are excited to announce that we are making your provider's discharge notes available to you in NullPointer.   You will see these notes when they are completed and signed by the physician that discharged you from your recent hospital stay. If you have any questions or concerns about any information you see in Connectipity, please call the Health Information Department where you were seen or reach out to your Primary Care Provider for more information about your plan of care. Introducing [facility]! Dear Álvaro Rivera: Thank you for requesting a Connectipity account. Our records indicate that you already have an active Connectipity account. You can access your account anytime at [email or web address]. [email or web address] Did you know that you can access your hospital and ER discharge instructions at any time in Connectipity? You can also review all of your test results from your hospital stay or ER visit. Additional Information If you have questions, please visit the Frequently Asked Questions section of the Connectipity website at [email or web address] Remember, Connectipity is NOT to be used for urgent needs. For medical emergencies, dial 911. Now available from your iPhone and Android! General Information Please provide this summary of care documentation to your next provider. Patient Signature:  ____________________________________________________________ Date:  ____________________________________________________________  
  
Eliud Breath Provider Signature:  ____________________________________________________________ Date:  ____________________________________________________________

## 2017-05-15 NOTE — DISCHARGE INSTRUCTIONS
POSTOPERATIVE INSTRUCTIONS  FOR D&C, HYSTEROSCOPY, POLYPECTOMY, MIRENA IUD INSERTION      A D&C is a minor procedure. Your recovery from each one of these procedures should be relatively quick and uneventful. There are a few points that we ask you to remember:       1. Absolutely no intercourse, douching or tampon use for two weeks. 2. You can expect to have some vaginal bleeding or bloody vaginal discharge for the    next 2 to 4 weeks. 3. You may take tub baths after one week and showers at any time. 4. You may resume normal everyday activities as you feel able and return to work    within 2-5 days after surgery. 5. Notify us if you experience:     a.  heavy vaginal bleeding or foul vaginal discharge    b.  temperature of 101° or greater    c.  severe pelvic or vaginal pain      6. Please call the office today at [phone] to schedule your checkup appointment    in 4-6 weeks. Above all, please notify us of a problem if it arises before we see you again. In an emergency, you may contact a doctor 24 hours a day at [phone]. Intrauterine Device (IUD) Insertion: Care Instructions  Your Care Instructions    The intrauterine device (IUD) is a very effective method of birth control. It is a small, plastic, T-shaped device that contains copper or hormones. The doctor inserts the IUD into your uterus. A plastic string tied to the end of the IUD hangs down through the cervix into the vagina. There are two types of IUDs. The copper IUD is effective for up to 10 years. The hormonal IUD is effective for either 3 years or 5 years, depending on which IUD is used. The hormonal IUD also reduces menstrual bleeding and cramping. Both types of IUD damage or kill the man's sperm. This means that the woman's egg does not join with the sperm. IUDs also change the lining of the uterus so that the egg does not lodge there.   The IUD is most likely to work well for women who have been pregnant before. Some women who have never been pregnant have more trouble keeping the IUD in the uterus. They also may have more pain and cramping after insertion. Follow-up care is a key part of your treatment and safety. Be sure to make and go to all appointments, and call your doctor if you are having problems. It's also a good idea to know your test results and keep a list of the medicines you take. How can you care for yourself at home? · You may experience some mild cramping and light bleeding (spotting) for 1 or 2 days. Use a hot water bottle or a heating pad set on low on your belly for pain. · Take an over-the-counter pain medicine, such as acetaminophen (Tylenol), ibuprofen (Advil, Motrin), and naproxen (Aleve) if needed. Read and follow all instructions on the label. · Do not take two or more pain medicines at the same time unless the doctor told you to. Many pain medicines have acetaminophen, which is Tylenol. Too much acetaminophen (Tylenol) can be harmful. · Check the string of your IUD after every period. To do this, insert a finger into your vagina and feel for the cervix, which is at the top of the vagina and feels harder than the rest of your vagina. You should be able to feel the thin, plastic string coming out of the opening of your cervix. If you cannot feel the string, use another form of birth control and make an appointment with your doctor to have the string checked. · If the IUD comes out, save it and call your doctor. Be sure to use another form of birth control while the IUD is out. · Use latex condoms to protect against sexually transmitted infections (STIs), such as gonorrhea and chlamydia. An IUD does not protect you from STIs. Having one sex partner (who does not have STIs and does not have sex with anyone else) is a good way to avoid STIs. When should you call for help? Call 911 anytime you think you may need emergency care.  For example, call if:  · You passed out (lost consciousness). · You have sudden, severe pain in your belly or pelvis. Call your doctor now or seek immediate medical care if:  · You have new belly or pelvic pain. · You have severe vaginal bleeding. This means that you are soaking through your usual pads or tampons each hour for 2 or more hours. · You are dizzy or lightheaded, or you feel like you may faint. · You have a fever and pelvic pain or vaginal discharge. · You have pelvic pain that is getting worse. Watch closely for changes in your health, and be sure to contact your doctor if:  · You cannot feel the string, or the IUD comes out. · You feel sick to your stomach, or you vomit. · You think you may be pregnant. Where can you learn more? Go to http://yumiko-maxine.info/. Enter D003 in the search box to learn more about \"Intrauterine Device (IUD) Insertion: Care Instructions. \"  Current as of: May 30, 2016  Content Version: 11.2  © 3177-9791 [employer]. Care instructions adapted under license by "[employer] (which disclaims liability or warranty for this information). If you have questions about a medical condition or this instruction, always ask your healthcare professional. [place] [address] any warranty or liability for your use of this information. After general anesthesia or intravenous sedation, for 24 hours or while taking prescription Narcotics:  · Limit your activities  · Do not drive and operate hazardous machinery  · Do not make important personal or business decisions  · Do  not drink alcoholic beverages  · If you have not urinated within 8 hours after discharge, please contact your surgeon on call.     Report the following to your surgeon:  · Excessive pain, swelling, redness or odor of or around the surgical area  · Temperature over 100.5  · Nausea and vomiting lasting longer than 4 hours or if unable to take medications  · Any signs of decreased circulation or nerve impairment to extremity: change in color, persistent  numbness, tingling, coldness or increase pain  · Any questions    .

## 2017-05-15 NOTE — H&P
Gynecology History and Physical    Name: Mark Serra MRN: [id number] SSN: [id number]    YOB: 1985  Age: 36 y.o. Sex: female       Subjective:      Chief complaint:  Abnormal uterine bleeding, menorrhagia to anemia    Glenda Novak is a 36 y.o.  female with a history of abnormal uterine bleeding. Previous workup included an endometrial biopsy which was suggestive of polyps and a sonohysterogram which showed thickened endometrium and was suspicious for endometrial polyps. Previous treatment measures included dilation and curretage. She is admitted for Procedure(s) (LRB):  HYSTEROSCOPY, DILATATION AND CURRETAGE, POLYPECTOMY WITH MYOSURE, MIRENA IUD INSERTION (N/A).       OB History     No data available        Past Medical History:   Diagnosis Date    Anemia     Heart murmur of      HLD (hyperlipidemia)     Pre-diabetes      Past Surgical History:   Procedure Laterality Date    HX HEENT      WISDOM TEETH    HX WISDOM TEETH EXTRACTION       Social History     Occupational History    [employer]    Student      Goes to [employer] for health services      Social History Main Topics    Smoking status: Former Smoker     Packs/day: 0.25     Years: 10.00     Types: Cigarettes     Quit date: 2014    Smokeless tobacco: Never Used    Alcohol use 3.0 oz/week     5 Standard drinks or equivalent per week      Comment: 1-2 PER WEEK    Drug use: No    Sexual activity: Not Currently     Partners: Male     Birth control/ protection: Pill     Family History   Problem Relation Age of Onset    Heart Disease Mother     Hypertension Mother     Psychiatric Disorder Mother     Other Mother      OVARIAN CYST, FIBROMYALGIA    Diabetes Father     Heart Disease Father     Psychiatric Disorder Sister     Cancer Maternal Grandmother      Lung cancer    Cancer Maternal Grandfather     Diabetes Other      Paternal great-grandmother   Feliz Saige Other      family members with kidney stones    Cancer Maternal Aunt      BREAST    Anesth Problems Neg Hx         Allergies   Allergen Reactions    Percocet [Oxycodone-Acetaminophen] Nausea and Vomiting     Prior to Admission medications    Medication Sig Start Date End Date Taking? Authorizing Provider   loratadine (CLARITIN) 10 mg tablet Take 10 mg by mouth daily as needed for Allergies. Yes Historical Provider   ferrous sulfate (IRON) 325 mg (65 mg iron) EC tablet Take 1 Tab by mouth two (2) times daily (with meals). 2/28/17  Yes Myles Wu MD   progesterone (PROMETRIUM) 100 mg capsule Take 100 mg by mouth daily. Historical Provider   ibuprofen (MOTRIN) 800 mg tablet Take 1 Tab by mouth every eight (8) hours as needed for Pain. 2/28/17   Myles Wu MD        Review of Systems:  A comprehensive review of systems was negative except for that written in the History of Present Illness. Objective:     Vitals:    05/15/17 0608   BP: 114/70   Pulse: (!) 106   Resp: 18   Temp: 98.5 °F (36.9 °C)   SpO2: 100%   Weight: 315 lb (142.9 kg)   Height: 5' 9\" (1.753 m)       Physical Exam:  Gen - Patient without distress. A&O. Abd - soft, nt, nd  Resp - non-labored  CV - no peripheral edema    Assessment:     Abnormal uterine bleeding, menorrhagia to anemia, suspected uterine polyps    Plan:     Procedure(s) (LRB):  HYSTEROSCOPY, DILATATION AND CURRETAGE, POLYPECTOMY WITH MYOSURE, MIRENA IUD INSERTION (N/A)  Discussed the risks of surgery including the risks of bleeding, infection, deep vein thrombosis, and surgical injuries to internal organs including but not limited to the bowels, bladder, rectum, and female reproductive organs.  The patient understands the risks; any and all questions were answered to the patient's satisfaction.    -scds  -no pre-op abx required  -will bring mirena device      Signed By:  Ena Boothe MD     May 15, 2017

## 2017-05-15 NOTE — PERIOP NOTES
Patient: Jed Mullen MRN: [id number]  SSN: [id number]   YOB: 1985  Age: 36 y.o. Sex: female     Patient is status post Procedure(s): HYSTEROSCOPY, DILATATION AND CURRETAGE, POLYPECTOMY WITH MYOSURE, MIRENA IUD INSERTION. Surgeon(s) and Role:     * Charlene Shabazz MD - Primary    Local/Dose/Irrigation:  See [facility]                  Peripheral IV 05/15/17 Right Antecubital (Active)   Site Assessment Clean, dry, & intact 5/15/2017  6:11 AM   Phlebitis Assessment 0 5/15/2017  6:11 AM   Infiltration Assessment 0 5/15/2017  6:11 AM   Dressing Status Clean, dry, & intact 5/15/2017  6:11 AM   Dressing Type Tape;Transparent 5/15/2017  6:11 AM   Hub Color/Line Status Green; Infusing 5/15/2017  6:11 AM   Action Taken Open ports on tubing capped 5/15/2017  6:11 AM   Alcohol Cap Used Yes 5/15/2017  6:11 AM                           Dressing/Packing:     Splint/Cast:  ]    Other:

## 2017-05-15 NOTE — ANESTHESIA POSTPROCEDURE EVALUATION
Post-Anesthesia Evaluation and Assessment    Patient: Ori Peunte MRN: [id number]  SSN: [id number]    YOB: 1985  Age: 36 y.o. Sex: female       Cardiovascular Function/Vital Signs  Visit Vitals    /70    Pulse 88    Temp 36.8 °C (98.2 °F)    Resp 12    Ht 5' 9\" (1.753 m)    Wt 142.9 kg (315 lb)    SpO2 99%    BMI 46.52 kg/m2       Patient is status post general anesthesia for Procedure(s): HYSTEROSCOPY, DILATATION AND CURRETAGE, POLYPECTOMY WITH MYOSURE, MIRENA IUD INSERTION. Nausea/Vomiting: None    Postoperative hydration reviewed and adequate. Pain:  Pain Scale 1: Numeric (0 - 10) (05/15/17 0925)  Pain Intensity 1: 2 (05/15/17 0925)   Managed    Neurological Status:   Neuro (WDL): Within Defined Limits (05/15/17 0925)  Neuro  LUE Motor Response: Purposeful (05/15/17 0925)  LLE Motor Response: Purposeful (05/15/17 0925)  RUE Motor Response: Purposeful (05/15/17 0925)  RLE Motor Response: Purposeful (05/15/17 0925)   At baseline    Mental Status and Level of Consciousness: Arousable    Pulmonary Status:   O2 Device: Room air (05/15/17 0925)   Adequate oxygenation and airway patent    Complications related to anesthesia: None    Post-anesthesia assessment completed.  No concerns    Signed By: Mark Serrato MD     May 15, 2017

## 2017-05-15 NOTE — BRIEF OP NOTE
BRIEF OPERATIVE NOTE    Date of Procedure: 5/15/2017   Preoperative Diagnosis: MENORRHAGIA, ABNORMAL UTERINE BLEEDING  Postoperative Diagnosis: MENORRHAGIA, ABNORMAL UTERINE BLEEDING    Procedure(s):   HYSTEROSCOPY, DILATATION AND CURRETAGE, POLYPECTOMY WITH MYOSURE, MIRENA IUD INSERTION  Surgeon(s) and Role:     * Mark Walker MD - Primary         Assistant Staff:   none     Surgical Staff:  Circ-1: Arlen Pina, RN  Scrub RN-1: Claudy Christie RN  Event Time In   Incision Start 4742   Incision Close      Anesthesia: General   Estimated Blood Loss: 10cc  Specimens: endometrial curettings  ID Type Source Tests Collected by Time Destination   1 : Endometrial Currettings Fresh Uterus  Mark Walker MD 5/15/2017 0708 Pathology      Findings: fluffy diffusely polypoid endometrial tissue with one small polyp on right lateral sidewall  Complications: none  Implants: * No implants in log *

## 2017-05-15 NOTE — OP NOTES
Hysteroscopy, D&C:    -Preop diagnosis: AUB, endometrial polyp, menorrhagia to anemia  -Postop diagnosis: same  -Procedure: hysteroscopy, dilation and curettage, myosure polypectomy, mirena IUD insertion  -Indication: 35 yo morbidly obese female with menorrhagia to anemia due to AUB-P.   -Surgeon: Sandoval Kwong MD  -Assistant: none  -Anesthesia: general  -Complications: none  -EBL: 10cc  -UOP: 100cc via straight cath at start of procedure  -Hysteroscopic fluid: normal saline  -Fluid deficit: 330cc  -Meds: none  -Specimen: endometrial curettings sent to pathology  -Device: Mirena IUD lot number [id number], exp 1/2020  -Findings: diffusely fluffy, polypoid endometrial tissue with small endometrial polyp on right lateral sidewall of uterine cavity    Description of procedure: Pt was taken to the operating room, was placed on the operating room table, and was given anesthesia (as per separate documentation). Pt had her legs placed in stirrups, and was prepped and draped in a sterile fashion. A time out was performed. A sterile speculum was inserted into the vagina. The cervix was visualized and grasped anteriorly using a single tooth tenaculum. Speculum was eventually replaced with weighted speculum and marrero retractor for improved visualziation. The uterus sounded to 8.5cm. The cervical os was serially dilated using Dianna dilators up to 6mm. Then a 6mm hysteroscope was inserted into the uterus and the above findings were noted. The Myosure reach device was used to remove right lateral endometrial polyp and perform diffuse sampling of fluffy polypoid endometrial tissue throughout. The hysteroscope was then removed and the uterus was sharply curetted in a gentle fashion until a gritty texture was noted throughout. The Mirena IUD was inserted in the usual fashion without difficulty at this time, strings were cut to a length of 4cm. The tenaculum and speculum were both removed. Hemostasis was noted to be excellent.  The patient then had her legs taken out of stirrups. The patient tolerated the procedure well, was extubated, and was taken to the recovery room in stable condition. There were no complications and pathology is pending.      Mushtaq Evans MD  5/15/2017  8:56 AM

## 2017-05-15 NOTE — PERIOP NOTES
Patient and friend  verbalized understanding of all instructions. Rx for Jarome Joanna given  All belongings taken.

## 2017-05-15 NOTE — DISCHARGE SUMMARY
Gynecology Discharge Summary     Patient ID:  Ant David  [id number]  36 y.o.  1985    Admit date: 5/15/2017    Discharge date and time: 5/15/17 mid-day    Admission Diagnoses:  AUB-P, menorrhagia to anemia  Patient Active Problem List   Diagnosis Code    Obesity, Class III, BMI 40-49.9 (morbid obesity) ([facility]) E66.01    Pre-diabetes R73.03    Microscopic hematuria R31.29    Anemia D64.9    Abnormal uterine bleeding (AUB) N93.9       Discharge Diagnoses: AUB-P, menorrhagia to anemia, s/p hysteroscopy, D&C, polypectomy, Mirena IUD insertion  Patient Active Problem List   Diagnosis Code    Obesity, Class III, BMI 40-49.9 (morbid obesity) ([facility] E66.01    Pre-diabetes R73.03    Microscopic hematuria R31.29    Anemia D64.9    Abnormal uterine bleeding (AUB) N93.9       Procedures for this admission: Procedure(s): HYSTEROSCOPY, DILATATION AND CURRETAGE, POLYPECTOMY WITH MYOSURE, MIRENA IUD INSERTION    Hospital Course: Pt was admitted for above listed procedure, procedure was uncomplicated. Pt tolerated it well, endometrial curettings were sent to pathology which is pending at this time. Post-op course uncomplicated. Pt to be discharged home same day of procedure if remains in stable condition. Disposition: Home or self care    Discharged Condition: stable            Patient Instructions:   Current Discharge Medication List      START taking these medications    Details   HYDROcodone-acetaminophen (NORCO) 5-325 mg per tablet Take 1 Tab by mouth every six (6) hours as needed for Pain for up to 1 day. Max Daily Amount: 4 Tabs. Qty: 5 Tab, Refills: 0         CONTINUE these medications which have NOT CHANGED    Details   loratadine (CLARITIN) 10 mg tablet Take 10 mg by mouth daily as needed for Allergies. ferrous sulfate (IRON) 325 mg (65 mg iron) EC tablet Take 1 Tab by mouth two (2) times daily (with meals).   Qty: 60 Tab, Refills: 0    Associated Diagnoses: Menorrhagia with irregular cycle ibuprofen (MOTRIN) 800 mg tablet Take 1 Tab by mouth every eight (8) hours as needed for Pain. Qty: 30 Tab, Refills: 0    Associated Diagnoses: Menorrhagia with irregular cycle         STOP taking these medications       progesterone (PROMETRIUM) 100 mg capsule Comments:   Reason for Stopping:             Activity: Activity as tolerated and no driving for today and No sex for 2 weeks  Diet: Regular Diet  Wound Care: Keep wound clean and dry and None needed    Follow-up with Dr. Brian Harrell in 6 weeks.     Signed:  Mily Gracia MD  5/15/2017  9:02 AM

## 2017-05-16 NOTE — PROGRESS NOTES
Please inform patient that endometrial curettings from her hysteroscopy D&C, polypectomy show benign polyp

## 2017-07-07 ENCOUNTER — OFFICE VISIT (OUTPATIENT)
Dept: OBGYN CLINIC | Age: 32
End: 2017-07-07

## 2017-07-07 VITALS
DIASTOLIC BLOOD PRESSURE: 76 MMHG | BODY MASS INDEX: 43.4 KG/M2 | HEIGHT: 69 IN | SYSTOLIC BLOOD PRESSURE: 120 MMHG | RESPIRATION RATE: 18 BRPM | WEIGHT: 293 LBS

## 2017-07-07 DIAGNOSIS — Z09 POSTOP CHECK: Primary | ICD-10-CM

## 2017-07-07 NOTE — PROGRESS NOTES
Chief Complaint   Patient presents with    Post OP Follow Up     Postop Evaluation  Seema Thomas is a 29 y.o. female returns for a routine post-operative follow-up visit after undergoing the following: hysteroscopy, D&C, polypectomy which was done ~2 months ago. Pathology showing benign polyp (see report below)  Since the patient's surgery, she has had typical postoperative discomfort but no significant symptoms or problems since the surgery. Just daily light spotting with Mirena IUD, she can feel strings, no pain or discomfort. She states since the procedure, she has returned to full daily activities, ambulating, and not lifting or exercising. No other new problems or concerns today. FINAL PATHOLOGIC DIAGNOSIS   Endometrium, curettings:   Atrophic endometrial glands and pseudo-decidualized stroma, consistent with progestin effect   Some fragments with features suggestive of endometrial polyp   Negative for hyperplasia or malignancy     PHYSICAL EXAMINATION  Visit Vitals    /76 (BP 1 Location: Right arm, BP Patient Position: Sitting)    Resp 18    Ht 5' 9\" (1.753 m)    Wt 314 lb 3.2 oz (142.5 kg)    BMI 46.4 kg/m2     Gastrointestinal  · Abdominal Examination: abdomen non-tender to palpation, non-distended  · Liver and spleen: no hepatomegaly present, spleen not palpable  · Hernias: no hernias identified    · Genitourinary: deferred, as asymptomatic    Neurologic/Psychiatric  · Mental Status:  · Orientation: grossly oriented to person, place and time  · Mood and Affect: mood normal, affect appropriate    Assessment:  Normal postop checkup    Plan:  RTO as scheduled for ELAINA Causey MD  7/7/2017  11:33 AM

## 2017-08-30 ENCOUNTER — OFFICE VISIT (OUTPATIENT)
Dept: FAMILY MEDICINE CLINIC | Age: 32
End: 2017-08-30

## 2017-08-30 VITALS
WEIGHT: 293 LBS | DIASTOLIC BLOOD PRESSURE: 79 MMHG | TEMPERATURE: 98.8 F | OXYGEN SATURATION: 99 % | HEIGHT: 69 IN | SYSTOLIC BLOOD PRESSURE: 112 MMHG | RESPIRATION RATE: 20 BRPM | BODY MASS INDEX: 43.4 KG/M2 | HEART RATE: 76 BPM

## 2017-08-30 DIAGNOSIS — J02.9 SORE THROAT: ICD-10-CM

## 2017-08-30 DIAGNOSIS — J06.9 UPPER RESPIRATORY TRACT INFECTION, UNSPECIFIED TYPE: Primary | ICD-10-CM

## 2017-08-30 LAB
S PYO AG THROAT QL: POSITIVE
VALID INTERNAL CONTROL?: YES

## 2017-08-30 RX ORDER — AZITHROMYCIN 250 MG/1
TABLET, FILM COATED ORAL
Qty: 6 TAB | Refills: 0 | Status: SHIPPED | OUTPATIENT
Start: 2017-08-30 | End: 2017-09-04

## 2017-08-30 NOTE — MR AVS SNAPSHOT
Visit Information Date & Time Provider Department Dept. Phone Encounter #  
 8/30/2017 10:30 AM Linda Pino MD [address] [facility] [phone] [id number] Upcoming Health Maintenance Date Due INFLUENZA AGE 9 TO ADULT 8/1/2017 PAP AKA CERVICAL CYTOLOGY 5/5/2018 DTaP/Tdap/Td series (2 - Td) 7/10/2024 Allergies as of 8/30/2017  Review Complete On: 8/30/2017 By: Linda Pino MD  
  
 Severity Noted Reaction Type Reactions Percocet [Oxycodone-acetaminophen]  05/08/2017    Nausea and Vomiting Current Immunizations  Reviewed on 11/27/2016 Name Date Influenza Vaccine 11/1/2016 MMR 8/14/2014, 7/14/2014 TB Skin Test (PPD) 7/14/2014 TB Skin Test (PPD) Intradermal 8/4/2015, 7/14/2015 Tdap 7/10/2014 Not reviewed this visit You Were Diagnosed With   
  
 Codes Comments Upper respiratory tract infection, unspecified type    -  Primary ICD-10-CM: J06.9 ICD-9-CM: 465.9 Vitals BP Pulse Temp Resp Height(growth percentile) Weight(growth percentile) 112/79 (BP 1 Location: Left arm, BP Patient Position: Sitting) 76 98.8 °F (37.1 °C) (Oral) 20 5' 9\" (1.753 m) 311 lb (141.1 kg) SpO2 BMI OB Status Smoking Status 99% 45.93 kg/m2 Medically Induced Former Smoker Vitals History BMI and BSA Data Body Mass Index Body Surface Area 45.93 kg/m 2 2.62 m 2 Preferred Pharmacy Pharmacy Name Phone [facility] [address], [address] [place] [address] [phone] Your Updated Medication List  
  
   
This list is accurate as of: 8/30/17 11:08 AM.  Always use your most recent med list.  
  
  
  
  
 azithromycin 250 mg tablet Commonly known as:  Talon Clos Take 2 tablets today, then take 1 tablet daily CLARITIN 10 mg tablet Generic drug:  loratadine Take 10 mg by mouth daily as needed for Allergies. ferrous sulfate 325 mg (65 mg iron) EC tablet Commonly known as:  IRON Take 1 Tab by mouth two (2) times daily (with meals). ibuprofen 800 mg tablet Commonly known as:  MOTRIN Take 1 Tab by mouth every eight (8) hours as needed for Pain. Prescriptions Sent to Pharmacy Refills  
 azithromycin (ZITHROMAX) 250 mg tablet 0 Sig: Take 2 tablets today, then take 1 tablet daily Class: Normal  
 Pharmacy: [address] #: [phone] Patient Instructions Gargle with warm salt water Take:   over the counter tylenol as directed for pain or fever Use OTC Mucinex for cough Consider over the counter nasacort steroid nasal spray Consider trying the \"NetiPot\" which is a salt water nasal flush if you have nasal congestion:  It really helps! Make sure you use distilled water to make the saline solution Introducing [facility] SERVICES! Dear Ashley Garcia: Thank you for requesting a [employer] account. Our records indicate that you already have an active [employer] account. You can access your account anytime at [email or web address]. [employer] Did you know that you can access your hospital and ER discharge instructions at any time in [employer]? You can also review all of your test results from your hospital stay or ER visit. Additional Information If you have questions, please visit the Frequently Asked Questions section of the [employer] website at [email or web address]/. Remember, [employer] is NOT to be used for urgent needs. For medical emergencies, dial 911. Now available from your iPhone and Android! Please provide this summary of care documentation to your next provider. Your primary care clinician is listed as Pedro Gilbert. If you have any questions after today's visit, please call [phone].

## 2017-08-30 NOTE — PROGRESS NOTES
Sidney Turpin is a 29 y.o. female      Issues discussed today include:        Signs and symptoms:  ST  Duration:  ffew days  Context:  Works with elderly  Location:    Quality:  sore  Severity:  No fevers  Timing:  now  Modifying factors:  Rx zmax    Data reviewed or ordered today:  Strep pos    Other problems include:  Patient Active Problem List   Diagnosis Code    Obesity, Class III, BMI 40-49.9 (morbid obesity) ([facility]) E66.01    Pre-diabetes R73.03    Microscopic hematuria R31.29    Anemia D64.9    Abnormal uterine bleeding (AUB) N93.9       Medications:  Current Outpatient Prescriptions   Medication Sig Dispense Refill    azithromycin (ZITHROMAX) 250 mg tablet Take 2 tablets today, then take 1 tablet daily 6 Tab 0    loratadine (CLARITIN) 10 mg tablet Take 10 mg by mouth daily as needed for Allergies.  ferrous sulfate (IRON) 325 mg (65 mg iron) EC tablet Take 1 Tab by mouth two (2) times daily (with meals). 60 Tab 0    ibuprofen (MOTRIN) 800 mg tablet Take 1 Tab by mouth every eight (8) hours as needed for Pain. 30 Tab 0       Allergies: Allergies   Allergen Reactions    Percocet [Oxycodone-Acetaminophen] Nausea and Vomiting       LMP:  No LMP recorded. Patient is not currently having periods (Reason: Medically Induced).     Social History     Social History    Marital status: SINGLE     Spouse name: N/A    Number of children: 0    Years of education: 15     Occupational History    A [facility]    Student      Goes to [employer] for health services      Social History Main Topics    Smoking status: Former Smoker     Packs/day: 0.25     Years: 10.00     Types: Cigarettes     Quit date: 7/30/2014    Smokeless tobacco: Never Used    Alcohol use 3.0 oz/week     5 Standard drinks or equivalent per week      Comment: 1-2 PER WEEK    Drug use: No    Sexual activity: Not Currently     Partners: Male     Birth control/ protection: Pill     Other Topics Concern    Not on file Social History Narrative         Family History   Problem Relation Age of Onset    Heart Disease Mother     Hypertension Mother     Psychiatric Disorder Mother     Other Mother      OVARIAN CYST, FIBROMYALGIA    Diabetes Father     Heart Disease Father     Psychiatric Disorder Sister     Cancer Maternal Grandmother      Lung cancer    Cancer Maternal Grandfather     Diabetes Other      Paternal great-grandmother   Sheryl Olson Other      family members with kidney stones    Cancer Maternal Aunt      BREAST    Anesth Problems Neg Hx        Meaningful use:  done      ROS:  Headaches:  no  Chest Pain:  no  SOB:  no  Fevers:  no  Falls:  no    Other significant ROS:      No LMP recorded. Patient is not currently having periods (Reason: Medically Induced). Physical Exam  Visit Vitals    /79 (BP 1 Location: Left arm, BP Patient Position: Sitting)    Pulse 76    Temp 98.8 °F (37.1 °C) (Oral)    Resp 20    Ht 5' 9\" (1.753 m)    Wt 311 lb (141.1 kg)    SpO2 99%    BMI 45.93 kg/m2     BP Readings from Last 3 Encounters:   08/30/17 112/79   07/07/17 120/76   05/15/17 113/82     Constitutional:  Appears well,  No Acute Distress, Vitals noted  Psychiatric:   Affect normal, Alert and cooperative, Oriented to person/place/time    Eyes:   Pupils equally round and reactive, EOMI, conjunctiva clear, eyelids normal  ENT:   External ears and nose normal/lips, teeth=OK/gums normal, TMs and Orophyarynx normal  Neck:   general inspection and Thyroid normal.  No abnormal cervical or supraclavicular nodes    Lungs:   clear to auscultation, good respiratory effort  Heart: Ausculation normal.  Regular rhythm. No cardiac murmurs.   No carotid bruits or palpable thrills  Chest wall normal  Abd:  benign  Extremities:   without edema, good peripheral pulses  Skin:   Warm to palpation, without rashes, bruising, or suspicious lesions           Assessment:    Patient Active Problem List   Diagnosis Code    Obesity, Class III, BMI 40-49.9 (morbid obesity) ([facility]) E66.01    Pre-diabetes R73.03    Microscopic hematuria R31.29    Anemia D64.9    Abnormal uterine bleeding (AUB) N93.9       Today's diagnoses are:    ICD-10-CM ICD-9-CM    1. Upper respiratory tract infection, unspecified type J06.9 465.9 azithromycin (ZITHROMAX) 250 mg tablet      AMB POC RAPID STREP A   2. Sore throat J02.9 462 azithromycin (ZITHROMAX) 250 mg tablet      AMB POC RAPID STREP A       Plan:  Orders Placed This Encounter    AMB POC RAPID STREP A    azithromycin (ZITHROMAX) 250 mg tablet     Sig: Take 2 tablets today, then take 1 tablet daily     Dispense:  6 Tab     Refill:  0       See patient instructions  Patient Instructions   Gargle with warm salt water    Take:   over the counter tylenol as directed for pain or fever    Use OTC Mucinex for cough    Consider over the counter nasacort steroid nasal spray    Consider trying the \"NetiPot\" which is a salt water nasal flush if you have nasal congestion:  It really helps! Make sure you use distilled water to make the saline solution              refresh note:  done    AVS Printed:  done      I have reviewed/discussed the above normal BMI with the patient. I have recommended the following interventions: encourage exercise . Lilly Worrell

## 2017-08-30 NOTE — PATIENT INSTRUCTIONS
Gargle with warm salt water    Take:   over the counter tylenol as directed for pain or fever    Use OTC Mucinex for cough    Consider over the counter nasacort steroid nasal spray    Consider trying the \"NetiPot\" which is a salt water nasal flush if you have nasal congestion:  It really helps!   Make sure you use distilled water to make the saline solution

## 2017-08-30 NOTE — LETTER
8/30/2017 11:22 AM 
 
Ms. Mustapha [facility] [id number] To Whom It May Concern, Please excuse from work today and tomorrow due to illness. Sincerely, Chiki Caldera MD

## 2017-08-30 NOTE — LETTER
8/30/2017 11:24 AM 
 
Ms. Luciano [facility] [id number] Body mass index is 45.93 kg/(m^2). Focus on regular exercise (150 minutes each week) and healthy eating. Eat more fruits and vegetables. Eat more protein (egg whites, beans, and nuts you know you tolerate) and less carbohydrates (white bread, white rice, white pasta, white potatoes, sodas, and sweets). Eat appropriately small portion sizes. Flu vaccine around [facility] Sincerely, Josie Bella MD

## 2017-11-22 ENCOUNTER — TELEPHONE (OUTPATIENT)
Dept: FAMILY MEDICINE CLINIC | Age: 32
End: 2017-11-22

## 2017-11-22 NOTE — TELEPHONE ENCOUNTER
----- Message from [facility] sent at 11/22/2017  3:14 PM EST -----  Regarding: Dr. Ezio Dunn  Pt needs a retro refrl for the following:    Dr. Hardki Pereyra and [place] [address]  T: [phone]  F: [phone]  E: [email or web address]      Pt appt was on 11/21/17. Pt can be reached at [phone].

## 2017-11-28 DIAGNOSIS — M99.01 CERVICAL SEGMENT DYSFUNCTION: ICD-10-CM

## 2017-11-28 DIAGNOSIS — M54.2 CERVICALGIA: Primary | ICD-10-CM

## 2017-11-28 DIAGNOSIS — M54.5 LOW BACK PAIN, UNSPECIFIED BACK PAIN LATERALITY, UNSPECIFIED CHRONICITY, WITH SCIATICA PRESENCE UNSPECIFIED: ICD-10-CM

## 2018-04-29 ENCOUNTER — TELEPHONE (OUTPATIENT)
Dept: OBGYN CLINIC | Age: 33
End: 2018-04-29

## 2022-07-27 ENCOUNTER — APPOINTMENT (RX ONLY)
Dept: URBAN - METROPOLITAN AREA CLINIC 132 | Facility: CLINIC | Age: 37
Setting detail: DERMATOLOGY
End: 2022-07-27

## 2022-07-27 DIAGNOSIS — L91.8 OTHER HYPERTROPHIC DISORDERS OF THE SKIN: ICD-10-CM

## 2022-07-27 DIAGNOSIS — L81.4 OTHER MELANIN HYPERPIGMENTATION: ICD-10-CM

## 2022-07-27 DIAGNOSIS — D18.0 HEMANGIOMA: ICD-10-CM

## 2022-07-27 DIAGNOSIS — D22 MELANOCYTIC NEVI: ICD-10-CM

## 2022-07-27 DIAGNOSIS — Z80.8 FAMILY HISTORY OF MALIGNANT NEOPLASM OF OTHER ORGANS OR SYSTEMS: ICD-10-CM

## 2022-07-27 PROBLEM — D22.5 MELANOCYTIC NEVI OF TRUNK: Status: ACTIVE | Noted: 2022-07-27

## 2022-07-27 PROBLEM — D18.01 HEMANGIOMA OF SKIN AND SUBCUTANEOUS TISSUE: Status: ACTIVE | Noted: 2022-07-27

## 2022-07-27 PROCEDURE — ? COUNSELING

## 2022-07-27 PROCEDURE — ? DEFER

## 2022-07-27 PROCEDURE — ? FULL BODY SKIN EXAM

## 2022-07-27 PROCEDURE — 99203 OFFICE O/P NEW LOW 30 MIN: CPT

## 2022-07-27 PROCEDURE — ? ADDITIONAL NOTES

## 2022-07-27 PROCEDURE — ? SUNSCREEN TREATMENT REGIMEN

## 2022-07-27 PROCEDURE — ? SUNSCREEN RECOMMENDATIONS

## 2022-07-27 ASSESSMENT — LOCATION SIMPLE DESCRIPTION DERM
LOCATION SIMPLE: RIGHT UPPER BACK
LOCATION SIMPLE: LEFT ANTERIOR NECK
LOCATION SIMPLE: LEFT CHEEK
LOCATION SIMPLE: CHEST
LOCATION SIMPLE: RIGHT ANTERIOR NECK

## 2022-07-27 ASSESSMENT — LOCATION DETAILED DESCRIPTION DERM
LOCATION DETAILED: LEFT SUPERIOR LATERAL NECK
LOCATION DETAILED: RIGHT MID-UPPER BACK
LOCATION DETAILED: LEFT INFERIOR CENTRAL MALAR CHEEK
LOCATION DETAILED: LEFT CLAVICULAR NECK
LOCATION DETAILED: RIGHT INFERIOR LATERAL NECK
LOCATION DETAILED: RIGHT INFERIOR MEDIAL UPPER BACK
LOCATION DETAILED: MIDDLE STERNUM

## 2022-07-27 ASSESSMENT — LOCATION ZONE DERM
LOCATION ZONE: TRUNK
LOCATION ZONE: FACE
LOCATION ZONE: NECK

## 2022-08-03 ENCOUNTER — APPOINTMENT (RX ONLY)
Dept: URBAN - METROPOLITAN AREA CLINIC 132 | Facility: CLINIC | Age: 37
Setting detail: DERMATOLOGY
End: 2022-08-03

## 2022-08-03 DIAGNOSIS — L91.8 OTHER HYPERTROPHIC DISORDERS OF THE SKIN: ICD-10-CM

## 2022-08-03 PROCEDURE — ? BENIGN DESTRUCTION COSMETIC

## 2022-08-03 PROCEDURE — ? ADDITIONAL NOTES

## 2022-08-03 PROCEDURE — ? COUNSELING

## 2022-08-03 ASSESSMENT — LOCATION ZONE DERM
LOCATION ZONE: NECK
LOCATION ZONE: TRUNK

## 2022-08-03 ASSESSMENT — LOCATION SIMPLE DESCRIPTION DERM
LOCATION SIMPLE: LEFT ANTERIOR NECK
LOCATION SIMPLE: CHEST
LOCATION SIMPLE: POSTERIOR NECK
LOCATION SIMPLE: RIGHT ANTERIOR NECK

## 2022-08-03 ASSESSMENT — LOCATION DETAILED DESCRIPTION DERM
LOCATION DETAILED: LEFT SUPERIOR LATERAL NECK
LOCATION DETAILED: LOWER STERNUM
LOCATION DETAILED: RIGHT SUPERIOR LATERAL NECK
LOCATION DETAILED: RIGHT INFERIOR POSTERIOR NECK
LOCATION DETAILED: RIGHT INFERIOR ANTERIOR NECK

## 2022-08-03 NOTE — PROCEDURE: BENIGN DESTRUCTION COSMETIC
Post-Care Instructions: I reviewed with the patient in detail post-care instructions. Patient is to wear sunprotection, and avoid picking at any of the treated lesions. Pt may apply Vaseline to crusted or scabbing areas.
Detail Level: Detailed
Consent: The patient's consent was obtained including but not limited to risks of crusting, scabbing, blistering, scarring, darker or lighter pigmentary change, recurrence, incomplete removal and infection.
Price (Use Numbers Only, No Special Characters Or $): 100.00
Anesthesia Type: 1% lidocaine with epinephrine
Anesthesia Volume In Cc: 0.5

## (undated) DEVICE — CATH URETH INTMIT ROB 16FR FUN -- CONVERT TO ITEM 179520

## (undated) DEVICE — TUBE ST FLD CTRL AQUILEX INFLO --

## (undated) DEVICE — Z INACTIVE USE 2527070 DRAPE SURG W40XL44IN UNDERBUTTOCK SMS POLYPR W/ PCH BK DISP

## (undated) DEVICE — Z DISCONTINUED NO SUB IDED DEVICE ABLAT ENDOMET UTER SOUNDING ES SURESOUND NOVASURE

## (undated) DEVICE — SINGLE BASIN: Brand: CARDINAL HEALTH

## (undated) DEVICE — PERI/GYN PACK: Brand: CONVERTORS

## (undated) DEVICE — X-RAY SPONGES,16 PLY: Brand: DERMACEA

## (undated) DEVICE — SKIN MARKER,REGULAR TIP WITH RULER AND LABELS: Brand: DEVON

## (undated) DEVICE — ASTOUND STANDARD SURGICAL GOWN, XL: Brand: CONVERTORS

## (undated) DEVICE — (D)DEVICE TISS REMOVAL -- SOLD AS BX/3 USE ITEM 335978

## (undated) DEVICE — HANDLE LT SNAP ON ULT DURABLE LENS FOR TRUMPF ALC DISPOSABLE

## (undated) DEVICE — TRAY PREP DRY W/ PREM GLV 2 APPL 6 SPNG 2 UNDPD 1 OVERWRAP

## (undated) DEVICE — INFECTION CONTROL KIT SYS

## (undated) DEVICE — PAD SANIT NPKN 4IN GRD

## (undated) DEVICE — TUBE ST FLD AQUILEX OUTFLO --

## (undated) DEVICE — DEVON™ KNEE AND BODY STRAP 60" X 3" (1.5 M X 7.6 CM): Brand: DEVON

## (undated) DEVICE — STERILE POLYISOPRENE POWDER-FREE SURGICAL GLOVES WITH EMOLLIENT COATING: Brand: PROTEXIS

## (undated) DEVICE — SOLUTION IRRIG 3000ML 0.9% SOD CHL FLX CONT 0797208] ICU MEDICAL INC]

## (undated) DEVICE — TISSUE REMOVAL SYSTEM FLUID MANAGEMENT ACCESSORIES: Brand: SYMPHION